# Patient Record
Sex: FEMALE | Race: WHITE | NOT HISPANIC OR LATINO | Employment: UNEMPLOYED | URBAN - NONMETROPOLITAN AREA
[De-identification: names, ages, dates, MRNs, and addresses within clinical notes are randomized per-mention and may not be internally consistent; named-entity substitution may affect disease eponyms.]

---

## 2017-01-25 ENCOUNTER — OFFICE VISIT (OUTPATIENT)
Dept: ENDOCRINOLOGY | Facility: CLINIC | Age: 47
End: 2017-01-25

## 2017-01-25 DIAGNOSIS — E10.649 UNCONTROLLED TYPE 1 DIABETES MELLITUS WITH HYPOGLYCEMIA WITHOUT COMA (HCC): Primary | ICD-10-CM

## 2017-01-25 PROCEDURE — PTNOCHG PR CUSTOM PT NO CHARGE VISIT: Performed by: INTERNAL MEDICINE

## 2017-01-25 NOTE — MR AVS SNAPSHOT
Suzy Coyne   2017 1:30 PM   Appointment    Dept Phone:  [phone]   Encounter #:  [id number]    Provider:  [facility]   Department:  [facility] ENDOCRINOLOGY                Your Full Care Plan              Your Updated Medication List          This list is accurate as of: 17  2:02 PM.  Always use your most recent med list.                ALTACE 5 MG capsule   Generic drug:  ramipril       GLUCAGEN HYPOKIT 1 MG injection   Generic drug:  glucagon       GLUCAGON EMERGENCY 1 MG injection   Generic drug:  glucagon       glucose blood test strip       HUMALOG 100 UNIT/ML solution cartridge   Generic drug:  Insulin Lispro       Urine Glucose-Ketones Test strip       VITAMIN D2 PO               Instructions     None    Patient Instructions History      Upcoming Appointments     Visit Type Date Time Department    DIABETES EDUCATION 2017  1:30 PM [facility]CR[facility]OLOGY [facility]    FOLLOW UP 2017 10:00 AM [facility]OCR[facility]OLOGY [facility]      MyChart Signup     [facility] [employer] allows you to send messages to your doctor, view your test results, renew your prescriptions, schedule appointments, and more. To sign up, go to [employer] and click on the Sign Up Now link in the New User? box. Enter your [employer] Activation Code exactly as it appears below along with the last four digits of your Social Security Number and your Date of Birth () to complete the sign-up process. If you do not sign up before the expiration date, you must request a new code.    [employer] Activation Code: [email or web address]  Expires: 2017  2:02 PM    If you have questions, you can email [email or web address] or call [phone] to talk to our [employer] staff. Remember, [employer] is NOT to be used for urgent needs. For medical emergencies, dial 911.               Other Info from Your Visit           Your Appointments     May 26, 2017 10:00 AM CDT   Follow Up  with Babatunde Gallardo MD   [facility] ENDOCRINOLOGY (--)    [address]  [facility] [place] [address] [place] [id number] [phone]           Arrive 15 minutes prior to appointment.              Allergies     No Known Allergies      Vital Signs     Smoking Status                   Never Smoker

## 2017-01-25 NOTE — PROGRESS NOTES
Suzy Coyne is a 47 y.o. female seen by diabetes educator 01/25/2017 for insulin pump download. Patient having fasting hypoglycemia. Decreased MN to 0800 basal to 0.55. Patient is manually bolusing for meals. Discussed the importance of monitoring bg 2 hours after eating. If hypoglycemia occurs after a meal, take note and next time give less insulin for that meal. Patient does not want to carb count. Patient to follow up with Dr. Colindres in 3 months.     Roxanna Aragon MS, RD, LD, CDE

## 2017-04-28 RX ORDER — CHOLECALCIFEROL (VITAMIN D3) 125 MCG
50000 TABLET ORAL
Qty: 8 TABLET | Refills: 2 | Status: SHIPPED | OUTPATIENT
Start: 2017-04-28 | End: 2018-05-07 | Stop reason: SDUPTHER

## 2017-08-02 ENCOUNTER — OFFICE VISIT (OUTPATIENT)
Dept: ENDOCRINOLOGY | Facility: CLINIC | Age: 47
End: 2017-08-02

## 2017-08-02 VITALS
BODY MASS INDEX: 32.98 KG/M2 | HEART RATE: 88 BPM | HEIGHT: 60 IN | SYSTOLIC BLOOD PRESSURE: 142 MMHG | DIASTOLIC BLOOD PRESSURE: 86 MMHG | WEIGHT: 168 LBS

## 2017-08-02 DIAGNOSIS — E55.9 VITAMIN D DEFICIENCY: ICD-10-CM

## 2017-08-02 DIAGNOSIS — E06.3 HASHIMOTO'S THYROIDITIS: ICD-10-CM

## 2017-08-02 DIAGNOSIS — E10.649 UNCONTROLLED TYPE 1 DIABETES MELLITUS WITH HYPOGLYCEMIA WITHOUT COMA (HCC): Primary | ICD-10-CM

## 2017-08-02 PROCEDURE — 99213 OFFICE O/P EST LOW 20 MIN: CPT | Performed by: NURSE PRACTITIONER

## 2017-08-02 RX ORDER — LEVOTHYROXINE SODIUM 0.1 MG/1
100 TABLET ORAL
COMMUNITY

## 2017-08-02 RX ORDER — TIZANIDINE 4 MG/1
TABLET ORAL
COMMUNITY
Start: 2017-07-07 | End: 2018-02-17

## 2017-08-02 RX ORDER — PRAVASTATIN SODIUM 20 MG
TABLET ORAL
COMMUNITY
Start: 2017-07-07 | End: 2017-09-13

## 2017-08-02 RX ORDER — CHOLECALCIFEROL (VITAMIN D3) 125 MCG
50000 TABLET ORAL
COMMUNITY
Start: 2017-04-28 | End: 2018-02-17

## 2017-08-02 NOTE — PROGRESS NOTES
Subjective    Suzy Coyne is a 47 y.o. female. she is here today for follow-up.    History of Present Illness         History of Present Illness  Duration/Timing:  Diabetes mellitus type 1  11 years     constant     Experiencing nocturnal hypoglycemia , severe      Severity (Complications/Hospitalizations)  Secondary Macrovascular Complications:  No CAD, No CVA, No PAD  Secondary Microvascular Complications:  No Diabetic Nephropathy, No proteinuria, No Diabetic Retinopathy, , No Diabetic Neuropathy     Context  Diabetes Regimen:  Insulin, Compliant with regimen    Lab Results   Component Value Date    HGBA1C 7.8 (H) 08/02/2016          Blood Glucose Readings     hypoglycemia unawareness      Associated Signs/Symptoms  Hyperglycemic Symptoms:  No polyuria, No polydipsia, No polyphagia, No blurred vision, No weight gain  Hypoglycemic Episodes:  Documented symptomatic hypoglycemia, Hypoglycemic unawareness               The following portions of the patient's history were reviewed and updated as appropriate:   Past Medical History:   Diagnosis Date   • Essential (primary) hypertension    • Hashimoto's thyroiditis    • Hypoglycemia    • Microalbuminuria    • Type I diabetes mellitus, uncontrolled    • Vitamin D deficiency      History reviewed. No pertinent surgical history.  Family History   Problem Relation Age of Onset   • Diabetes Neg Hx      OB History     No data available        Current Outpatient Prescriptions   Medication Sig Dispense Refill   • Ergocalciferol (VITAMIN D2) 2000 UNITS tablet Take 50,000 Units by mouth.     • cyclobenzaprine (FLEXERIL) 10 MG tablet Take 1 tablet by mouth 3 (Three) Times a Day As Needed for Muscle Spasms. 20 tablet 0   • Ergocalciferol (VITAMIN D2) 2000 UNITS tablet Take 50,000 Units by mouth Every 14 (Fourteen) Days. 8 tablet 2   • glucagon (GLUCAGEN HYPOKIT) 1 MG injection Infuse  into a venous catheter 1 (one) time. As directed     • glucagon (GLUCAGON EMERGENCY) 1 MG  injection Inject 1 mg under the skin 1 (one) time as needed for low blood sugar.     • glucose blood test strip 1 each by Other route 6 (six) times a day. Use as instructed     • insulin lispro (humaLOG) 100 UNIT/ML injection Inject  under the skin.     • Insulin Lispro, Human, (HUMALOG) 100 UNIT/ML solution cartridge Inject 90 Units under the skin daily. Through the insulin pump     • KETOCARE strip      • levothyroxine (SYNTHROID, LEVOTHROID) 100 MCG tablet Take 100 mcg by mouth.     • naproxen (EC NAPROSYN) 500 MG EC tablet Take 1 tablet by mouth 2 (Two) Times a Day As Needed for Mild Pain (1-3). 30 tablet 0   • pravastatin (PRAVACHOL) 20 MG tablet      • ramipril (ALTACE) 5 MG capsule Take 5 mg by mouth daily.     • tiZANidine (ZANAFLEX) 4 MG tablet      • Urine Glucose-Ketones Test strip Use as indicated       No current facility-administered medications for this visit.      No Known Allergies  Social History     Social History   • Marital status:      Spouse name: N/A   • Number of children: N/A   • Years of education: N/A     Social History Main Topics   • Smoking status: Never Smoker   • Smokeless tobacco: Never Used   • Alcohol use No   • Drug use: No   • Sexual activity: Not Asked     Other Topics Concern   • None     Social History Narrative       Review of Systems  Review of Systems   Constitutional: Negative for activity change, appetite change, diaphoresis and fatigue.   HENT: Negative for congestion, dental problem, drooling, ear discharge, facial swelling, sneezing, sore throat, tinnitus, trouble swallowing and voice change.    Eyes: Negative for photophobia, pain, discharge, redness, itching and visual disturbance.   Respiratory: Negative for apnea, cough, choking, chest tightness and shortness of breath.    Cardiovascular: Negative for chest pain, palpitations and leg swelling.   Gastrointestinal: Negative for abdominal distention, abdominal pain, constipation, diarrhea, nausea and  "vomiting.   Endocrine: Negative for cold intolerance, heat intolerance, polydipsia, polyphagia and polyuria.   Genitourinary: Negative for difficulty urinating, dysuria, frequency, hematuria and urgency.   Musculoskeletal: Negative for arthralgias, back pain, gait problem, joint swelling, myalgias, neck pain and neck stiffness.   Skin: Negative for color change, pallor, rash and wound.   Allergic/Immunologic: Negative for environmental allergies, food allergies and immunocompromised state.   Neurological: Negative for dizziness, tremors, facial asymmetry, weakness, light-headedness, numbness and headaches.   Hematological: Negative for adenopathy. Does not bruise/bleed easily.   Psychiatric/Behavioral: Negative for agitation, behavioral problems, confusion and sleep disturbance.        Objective    /86 (BP Location: Left arm, Patient Position: Sitting, Cuff Size: Adult)  Pulse 88  Ht 60\" (152.4 cm)  Wt 168 lb (76.2 kg)  BMI 32.81 kg/m2  Physical Exam   Constitutional: She is oriented to person, place, and time. She appears well-developed and well-nourished. No distress.   HENT:   Head: Normocephalic and atraumatic.   Right Ear: External ear normal.   Left Ear: External ear normal.   Nose: Nose normal.   Eyes: Conjunctivae and EOM are normal. Pupils are equal, round, and reactive to light.   Neck: Normal range of motion. Neck supple. No tracheal deviation present. No thyromegaly present.   Cardiovascular: Normal rate, regular rhythm and normal heart sounds.    No murmur heard.  Pulmonary/Chest: Effort normal and breath sounds normal. No respiratory distress. She has no wheezes.   Abdominal: Soft. Bowel sounds are normal. There is no tenderness. There is no rebound and no guarding.   Musculoskeletal: Normal range of motion. She exhibits no edema, tenderness or deformity.   Neurological: She is alert and oriented to person, place, and time. No cranial nerve deficit.   Skin: Skin is warm and dry. No rash " noted.   Psychiatric: She has a normal mood and affect. Her behavior is normal. Judgment and thought content normal.       Lab Review  Glucose (mg/dl)   Date Value   08/02/2016 170 (H)   02/05/2016 145 (H)     Sodium (mmol/L)   Date Value   08/02/2016 138   02/05/2016 138     Potassium (mmol/L)   Date Value   08/02/2016 4.8   02/05/2016 4.5     Chloride (mmol/L)   Date Value   08/02/2016 103   02/05/2016 100     CO2 (mmol/L)   Date Value   08/02/2016 28   02/05/2016 25     BUN (mg/dl)   Date Value   08/02/2016 12   02/05/2016 8     Creatinine (mg/dl)   Date Value   08/02/2016 0.8   02/05/2016 0.6     Hemoglobin A1C (%TotHgb)   Date Value   08/02/2016 7.8 (H)   02/05/2016 8.5 (H)     Triglycerides (mg/dl)   Date Value   02/05/2016 167       Assessment/Plan      1. Uncontrolled type 1 diabetes mellitus with hypoglycemia without coma    2. Hashimoto's thyroiditis    3. Vitamin D deficiency    .    Medications prescribed:  Outpatient Encounter Prescriptions as of 8/2/2017   Medication Sig Dispense Refill   • Ergocalciferol (VITAMIN D2) 2000 UNITS tablet Take 50,000 Units by mouth.     • cyclobenzaprine (FLEXERIL) 10 MG tablet Take 1 tablet by mouth 3 (Three) Times a Day As Needed for Muscle Spasms. 20 tablet 0   • Ergocalciferol (VITAMIN D2) 2000 UNITS tablet Take 50,000 Units by mouth Every 14 (Fourteen) Days. 8 tablet 2   • glucagon (GLUCAGEN HYPOKIT) 1 MG injection Infuse  into a venous catheter 1 (one) time. As directed     • glucagon (GLUCAGON EMERGENCY) 1 MG injection Inject 1 mg under the skin 1 (one) time as needed for low blood sugar.     • glucose blood test strip 1 each by Other route 6 (six) times a day. Use as instructed     • insulin lispro (humaLOG) 100 UNIT/ML injection Inject  under the skin.     • Insulin Lispro, Human, (HUMALOG) 100 UNIT/ML solution cartridge Inject 90 Units under the skin daily. Through the insulin pump     • KETOCARE strip      • levothyroxine (SYNTHROID, LEVOTHROID) 100 MCG tablet  Take 100 mcg by mouth.     • naproxen (EC NAPROSYN) 500 MG EC tablet Take 1 tablet by mouth 2 (Two) Times a Day As Needed for Mild Pain (1-3). 30 tablet 0   • pravastatin (PRAVACHOL) 20 MG tablet      • ramipril (ALTACE) 5 MG capsule Take 5 mg by mouth daily.     • tiZANidine (ZANAFLEX) 4 MG tablet      • Urine Glucose-Ketones Test strip Use as indicated       No facility-administered encounter medications on file as of 8/2/2017.        Orders placed during this encounter include:  Orders Placed This Encounter   Procedures   • Comprehensive Metabolic Panel   • Hemoglobin A1c   • TSH   • Vitamin D 25 Hydroxy   • CBC & Differential     Order Specific Question:   Manual Differential     Answer:   No     Type I diabetes mellitus, uncontrolled            Glycemic Management      Insulin Pump         before pump      lantus 25 units at night        Humalog 5 to 15 based on experience  ---     now on omnipod     basal 0.8 -   change MN to 11 a.m. to 0.75      MN 0.7  11 a.m 0.8     Change        MN 0.5  11 a.m. 0.8  10:30 - 0.6     bolusing by experience     using dexcom     bolusing after meals     try to bolus before meals     no need for carb counitng as long as 2 hour post rise doesn't exceed 180         insurance doesn't want to pay for freestyle     she is checking with a different meter and having to enter that into omnipod and that , of course, results in underdocumentation when she forgets to enter. This is particularly true with low glucose     needs freestyle     Patient has Ketone Strips     Patient was advised to go to ER if BG is more than 250, ketones are present and symptoms are present      Hypoglycemia Management     Discussed Rule of 15   Patent Has Glucagon Pen      Lipid Management              no need for statin   Blood Pressure Management     Patient is on ACE inhibitor  On altace and bp controlled     Microvascular Complications     Last Eye Exam      Microalbuminuria present  Patient is on  ACE inhibitor     Diabetic Neuropathy absent             Weight  Management         General weight loss/lifestyle modification strategies discussed (elicit support from others; identify saboteurs; non-food rewards, etc).        Smoking Cessation                 Bone / Calcium     Vit d def , 2-16 at 24     On vit d 50 th u q 2 w      Thyroid            Lab Results   Component Value Date     TSH 0.11 (L) 08/02/2016      On brand name synthroid 125 , skip one day per week until you run out   chage to 100         B12           Lab Results   Component Value Date     [id number] 262 02/05/2016            Immunizations     Has Had Pneumovax no  Has Had Prevnar 13no    Will keep appt on August 31, 2017 with Dr.Chang Mario one week prior to appt    4. Follow-up: Return for Recheck.

## 2017-09-13 ENCOUNTER — APPOINTMENT (OUTPATIENT)
Dept: LAB | Facility: HOSPITAL | Age: 47
End: 2017-09-13

## 2017-09-13 ENCOUNTER — OFFICE VISIT (OUTPATIENT)
Dept: ENDOCRINOLOGY | Facility: CLINIC | Age: 47
End: 2017-09-13

## 2017-09-13 VITALS
BODY MASS INDEX: 32.08 KG/M2 | HEIGHT: 60 IN | HEART RATE: 78 BPM | WEIGHT: 163.4 LBS | DIASTOLIC BLOOD PRESSURE: 82 MMHG | SYSTOLIC BLOOD PRESSURE: 136 MMHG

## 2017-09-13 DIAGNOSIS — E78.2 MIXED DIABETIC HYPERLIPIDEMIA ASSOCIATED WITH TYPE 1 DIABETES MELLITUS (HCC): ICD-10-CM

## 2017-09-13 DIAGNOSIS — D72.829 LEUKOCYTOSIS, UNSPECIFIED TYPE: ICD-10-CM

## 2017-09-13 DIAGNOSIS — E10.649 TYPE 1 DIABETES MELLITUS WITH HYPOGLYCEMIA AND WITHOUT COMA (HCC): Primary | ICD-10-CM

## 2017-09-13 DIAGNOSIS — E03.8 HYPOTHYROIDISM DUE TO HASHIMOTO'S THYROIDITIS: ICD-10-CM

## 2017-09-13 DIAGNOSIS — E10.69 MIXED DIABETIC HYPERLIPIDEMIA ASSOCIATED WITH TYPE 1 DIABETES MELLITUS (HCC): ICD-10-CM

## 2017-09-13 DIAGNOSIS — E55.9 VITAMIN D DEFICIENCY: ICD-10-CM

## 2017-09-13 DIAGNOSIS — E06.3 HASHIMOTO'S THYROIDITIS: ICD-10-CM

## 2017-09-13 DIAGNOSIS — E06.3 HYPOTHYROIDISM DUE TO HASHIMOTO'S THYROIDITIS: ICD-10-CM

## 2017-09-13 DIAGNOSIS — F98.8 ADD (ATTENTION DEFICIT DISORDER): ICD-10-CM

## 2017-09-13 LAB
25(OH)D3 SERPL-MCNC: 44.3 NG/ML (ref 30–100)
ALBUMIN SERPL-MCNC: 4.2 G/DL (ref 3.4–4.8)
ALBUMIN UR-MCNC: 2.1 MG/L
ALBUMIN/GLOB SERPL: 1.1 G/DL (ref 1.1–1.8)
ALP SERPL-CCNC: 91 U/L (ref 38–126)
ALT SERPL W P-5'-P-CCNC: 27 U/L (ref 9–52)
ANION GAP SERPL CALCULATED.3IONS-SCNC: 9 MMOL/L (ref 5–15)
ARTICHOKE IGE QN: 145 MG/DL (ref 1–129)
AST SERPL-CCNC: 19 U/L (ref 14–36)
BASOPHILS # BLD AUTO: 0.02 10*3/MM3 (ref 0–0.2)
BASOPHILS NFR BLD AUTO: 0.2 % (ref 0–2)
BILIRUB SERPL-MCNC: 0.5 MG/DL (ref 0.2–1.3)
BUN BLD-MCNC: 8 MG/DL (ref 7–21)
BUN/CREAT SERPL: 8.4 (ref 7–25)
CALCIUM SPEC-SCNC: 8.8 MG/DL (ref 8.4–10.2)
CHLORIDE SERPL-SCNC: 97 MMOL/L (ref 95–110)
CHOLEST SERPL-MCNC: 220 MG/DL (ref 0–199)
CO2 SERPL-SCNC: 25 MMOL/L (ref 22–31)
CREAT BLD-MCNC: 0.95 MG/DL (ref 0.5–1)
CREAT UR-MCNC: 130.6 MG/DL
DEPRECATED RDW RBC AUTO: 43.5 FL (ref 36.4–46.3)
EOSINOPHIL # BLD AUTO: 0.12 10*3/MM3 (ref 0–0.7)
EOSINOPHIL NFR BLD AUTO: 1 % (ref 0–7)
ERYTHROCYTE [DISTWIDTH] IN BLOOD BY AUTOMATED COUNT: 13.5 % (ref 11.5–14.5)
GFR SERPL CREATININE-BSD FRML MDRD: 63 ML/MIN/1.73 (ref 60–135)
GLOBULIN UR ELPH-MCNC: 3.8 GM/DL (ref 2.3–3.5)
GLUCOSE BLD-MCNC: 139 MG/DL (ref 60–100)
GLUCOSE BLDC GLUCOMTR-MCNC: 148 MG/DL (ref 70–130)
HCT VFR BLD AUTO: 44.2 % (ref 35–45)
HDLC SERPL-MCNC: 51 MG/DL (ref 60–200)
HGB BLD-MCNC: 14.7 G/DL (ref 12–15.5)
IMM GRANULOCYTES # BLD: 0.05 10*3/MM3 (ref 0–0.02)
IMM GRANULOCYTES NFR BLD: 0.4 % (ref 0–0.5)
LDLC/HDLC SERPL: 2.75 {RATIO} (ref 0–3.22)
LYMPHOCYTES # BLD AUTO: 2.72 10*3/MM3 (ref 0.6–4.2)
LYMPHOCYTES NFR BLD AUTO: 22.5 % (ref 10–50)
MCH RBC QN AUTO: 29.4 PG (ref 26.5–34)
MCHC RBC AUTO-ENTMCNC: 33.3 G/DL (ref 31.4–36)
MCV RBC AUTO: 88.4 FL (ref 80–98)
MICROALBUMIN/CREAT UR: 16.1 MG/G (ref 0–30)
MONOCYTES # BLD AUTO: 0.83 10*3/MM3 (ref 0–0.9)
MONOCYTES NFR BLD AUTO: 6.9 % (ref 0–12)
NEUTROPHILS # BLD AUTO: 8.36 10*3/MM3 (ref 2–8.6)
NEUTROPHILS NFR BLD AUTO: 69 % (ref 37–80)
PLATELET # BLD AUTO: 309 10*3/MM3 (ref 150–450)
PMV BLD AUTO: 10.2 FL (ref 8–12)
POTASSIUM BLD-SCNC: 4.6 MMOL/L (ref 3.5–5.1)
PROT SERPL-MCNC: 8 G/DL (ref 6.3–8.6)
RBC # BLD AUTO: 5 10*6/MM3 (ref 3.77–5.16)
SODIUM BLD-SCNC: 131 MMOL/L (ref 137–145)
TRIGL SERPL-MCNC: 144 MG/DL (ref 20–199)
TSH SERPL DL<=0.05 MIU/L-ACNC: 4.22 MIU/ML (ref 0.46–4.68)
VIT B12 BLD-MCNC: 291 PG/ML (ref 239–931)
WBC NRBC COR # BLD: 12.1 10*3/MM3 (ref 3.2–9.8)

## 2017-09-13 PROCEDURE — 82962 GLUCOSE BLOOD TEST: CPT | Performed by: INTERNAL MEDICINE

## 2017-09-13 PROCEDURE — 82570 ASSAY OF URINE CREATININE: CPT | Performed by: INTERNAL MEDICINE

## 2017-09-13 PROCEDURE — 82607 VITAMIN B-12: CPT | Performed by: INTERNAL MEDICINE

## 2017-09-13 PROCEDURE — 82043 UR ALBUMIN QUANTITATIVE: CPT | Performed by: INTERNAL MEDICINE

## 2017-09-13 PROCEDURE — 85025 COMPLETE CBC W/AUTO DIFF WBC: CPT | Performed by: INTERNAL MEDICINE

## 2017-09-13 PROCEDURE — 80061 LIPID PANEL: CPT | Performed by: INTERNAL MEDICINE

## 2017-09-13 PROCEDURE — 36415 COLL VENOUS BLD VENIPUNCTURE: CPT | Performed by: INTERNAL MEDICINE

## 2017-09-13 PROCEDURE — 80053 COMPREHEN METABOLIC PANEL: CPT | Performed by: INTERNAL MEDICINE

## 2017-09-13 PROCEDURE — 99214 OFFICE O/P EST MOD 30 MIN: CPT | Performed by: INTERNAL MEDICINE

## 2017-09-13 PROCEDURE — 82306 VITAMIN D 25 HYDROXY: CPT | Performed by: INTERNAL MEDICINE

## 2017-09-13 PROCEDURE — 84443 ASSAY THYROID STIM HORMONE: CPT | Performed by: INTERNAL MEDICINE

## 2017-09-13 PROCEDURE — 83036 HEMOGLOBIN GLYCOSYLATED A1C: CPT | Performed by: INTERNAL MEDICINE

## 2017-09-13 NOTE — PROGRESS NOTES
Suzy Coyne is a 47 y.o. female who presents for fu evalution of  Type 1 diabetes mellitus.      Referred by No ref. provider found  Jeff Maldonado MD      History of Present Illness  Duration/Timing:  Diabetes mellitus type 1  11 years    constant    Experiencing nocturnal hypoglycemia , severe     Severity (Complications/Hospitalizations)  Secondary Macrovascular Complications:  No CAD, No CVA, No PAD  Secondary Microvascular Complications:  No Diabetic Nephropathy, No proteinuria, No Diabetic Retinopathy, Date of last eye exam 02/05/2014, No Diabetic Neuropathy    Context  Diabetes Regimen:  Insulin, Compliant with regimen    Blood Glucose Readings    hypoglycemia unawareness     Associated Signs/Symptoms  Hyperglycemic Symptoms:  No polyuria, No polydipsia, No polyphagia, No blurred vision, No weight gain  Hypoglycemic Episodes:  Documented symptomatic hypoglycemia, Hypoglycemic unawareness        Past Medical History:   Diagnosis Date   • Essential (primary) hypertension    • Hashimoto's thyroiditis    • Hypoglycemia    • Microalbuminuria    • Type I diabetes mellitus, uncontrolled    • Vitamin D deficiency      Family History   Problem Relation Age of Onset   • Diabetes Neg Hx      Social History   Substance Use Topics   • Smoking status: Never Smoker   • Smokeless tobacco: Never Used   • Alcohol use No       Review of Systems    Review of Systems   Constitutional: Negative for activity change, appetite change, chills, diaphoresis, fatigue, fever and unexpected weight change.   HENT: Negative for congestion, drooling, ear discharge, ear pain, facial swelling, mouth sores, nosebleeds, postnasal drip, sinus pressure, sneezing, sore throat, tinnitus, trouble swallowing and voice change.    Eyes: Negative.  Negative for photophobia, pain, discharge, redness and itching.   Respiratory: Negative.  Negative for apnea, cough, choking, chest tightness, shortness of breath, wheezing and stridor.   "  Cardiovascular: Negative.  Negative for chest pain, palpitations and leg swelling.   Gastrointestinal: Negative.  Negative for abdominal distention, abdominal pain, constipation, diarrhea, nausea and vomiting.   Endocrine: Negative.  Negative for cold intolerance, heat intolerance, polydipsia, polyphagia and polyuria.   Genitourinary: Negative for difficulty urinating, dysuria, flank pain and frequency.   Musculoskeletal: Negative for arthralgias, back pain, gait problem, joint swelling, myalgias, neck pain and neck stiffness.   Skin: Negative for color change, pallor, rash and wound.   Allergic/Immunologic: Negative for environmental allergies, food allergies and immunocompromised state.   Neurological: Negative for dizziness, tremors, seizures, syncope, facial asymmetry, speech difficulty, weakness, light-headedness, numbness and headaches.   Hematological: Negative for adenopathy. Does not bruise/bleed easily.   Psychiatric/Behavioral: Negative for agitation, behavioral problems, confusion, decreased concentration, dysphoric mood, hallucinations, self-injury, sleep disturbance and suicidal ideas. The patient is not nervous/anxious and is not hyperactive.         Objective:     /82 (BP Location: Left arm, Patient Position: Sitting, Cuff Size: Adult)  Pulse 78  Ht 60\" (152.4 cm)  Wt 163 lb 6.4 oz (74.1 kg)  BMI 31.91 kg/m2    Physical Exam   Constitutional: She is oriented to person, place, and time. She appears well-developed.   HENT:   Head: Normocephalic.   Right Ear: External ear normal.   Left Ear: External ear normal.   Nose: Nose normal.   Eyes: Conjunctivae and EOM are normal. No scleral icterus.   Neck: Normal range of motion. Neck supple. No tracheal deviation present. No thyromegaly present.   Cardiovascular: Normal rate, regular rhythm, normal heart sounds and intact distal pulses.  Exam reveals no gallop and no friction rub.    No murmur heard.  Pulmonary/Chest: Effort normal and breath " sounds normal. No stridor. No respiratory distress. She has no wheezes. She has no rales. She exhibits no tenderness.   Abdominal: Soft. Bowel sounds are normal. She exhibits no distension and no mass. There is no tenderness. There is no rebound and no guarding.   Musculoskeletal: Normal range of motion. She exhibits no tenderness or deformity.   Lymphadenopathy:     She has no cervical adenopathy.   Neurological: She is alert and oriented to person, place, and time. She displays normal reflexes. She exhibits normal muscle tone. Coordination normal.   Skin: No rash noted. No erythema. No pallor.   Psychiatric: She has a normal mood and affect. Her behavior is normal. Judgment and thought content normal.       Lab Review    Lab Results   Component Value Date    HGBA1C 7.8 (H) 09/13/2017       Results for orders placed or performed in visit on 09/13/17   CBC Auto Differential   Result Value Ref Range    WBC 12.10 (H) 3.20 - 9.80 10*3/mm3    RBC 5.00 3.77 - 5.16 10*6/mm3    Hemoglobin 14.7 12.0 - 15.5 g/dL    Hematocrit 44.2 35.0 - 45.0 %    MCV 88.4 80.0 - 98.0 fL    MCH 29.4 26.5 - 34.0 pg    MCHC 33.3 31.4 - 36.0 g/dL    RDW 13.5 11.5 - 14.5 %    RDW-SD 43.5 36.4 - 46.3 fl    MPV 10.2 8.0 - 12.0 fL    Platelets 309 150 - 450 10*3/mm3    Neutrophil % 69.0 37.0 - 80.0 %    Lymphocyte % 22.5 10.0 - 50.0 %    Monocyte % 6.9 0.0 - 12.0 %    Eosinophil % 1.0 0.0 - 7.0 %    Basophil % 0.2 0.0 - 2.0 %    Immature Grans % 0.4 0.0 - 0.5 %    Neutrophils, Absolute 8.36 2.00 - 8.60 10*3/mm3    Lymphocytes, Absolute 2.72 0.60 - 4.20 10*3/mm3    Monocytes, Absolute 0.83 0.00 - 0.90 10*3/mm3    Eosinophils, Absolute 0.12 0.00 - 0.70 10*3/mm3    Basophils, Absolute 0.02 0.00 - 0.20 10*3/mm3    Immature Grans, Absolute 0.05 (H) 0.00 - 0.02 10*3/mm3   Comprehensive Metabolic Panel   Result Value Ref Range    Glucose 139 (H) 60 - 100 mg/dL    BUN 8 7 - 21 mg/dL    Creatinine 0.95 0.50 - 1.00 mg/dL    Sodium 131 (L) 137 - 145 mmol/L     Potassium 4.6 3.5 - 5.1 mmol/L    Chloride 97 95 - 110 mmol/L    CO2 25.0 22.0 - 31.0 mmol/L    Calcium 8.8 8.4 - 10.2 mg/dL    Total Protein 8.0 6.3 - 8.6 g/dL    Albumin 4.20 3.40 - 4.80 g/dL    ALT (SGPT) 27 9 - 52 U/L    AST (SGOT) 19 14 - 36 U/L    Alkaline Phosphatase 91 38 - 126 U/L    Total Bilirubin 0.5 0.2 - 1.3 mg/dL    eGFR Non African Amer 63 >60 mL/min/1.73    Globulin 3.8 (H) 2.3 - 3.5 gm/dL    A/G Ratio 1.1 1.1 - 1.8 g/dL    BUN/Creatinine Ratio 8.4 7.0 - 25.0    Anion Gap 9.0 5.0 - 15.0 mmol/L   Hemoglobin A1c   Result Value Ref Range    Hemoglobin A1C 7.8 (H) 4 - 5.6 %   Lipid Panel   Result Value Ref Range    Total Cholesterol 220 (H) 0 - 199 mg/dL    Triglycerides 144 20 - 199 mg/dL    HDL Cholesterol 51 (L) 60 - 200 mg/dL    LDL Cholesterol  145 (H) 1 - 129 mg/dL    LDL/HDL Ratio 2.75 0.00 - 3.22   Microalbumin / Creatinine Urine Ratio   Result Value Ref Range    Microalbumin/Creatinine Ratio 16.1 0.0 - 30.0 mg/g    Creatinine, Urine 130.6 mg/dL    Microalbumin, Urine 2.1 mg/L   TSH   Result Value Ref Range    TSH 4.220 0.460 - 4.680 mIU/mL   Vitamin B12   Result Value Ref Range    Vitamin B-12 291 239 - 931 pg/mL   Vitamin D 25 Hydroxy   Result Value Ref Range    25 Hydroxy, Vitamin D 44.3 30.0 - 100.0 ng/ml   POC Glucose Fingerstick   Result Value Ref Range    Glucose 148 (A) 70 - 130 mg/dL       Lab Results   Component Value Date    TSH 4.220 09/13/2017         Assessment/Plan     Suzy was seen today for diabetes.    Diagnoses and all orders for this visit:    Type 1 diabetes mellitus with hypoglycemia and without coma  -     POC Glucose Fingerstick  -     CBC Auto Differential  -     Comprehensive Metabolic Panel  -     Hemoglobin A1c  -     Lipid Panel  -     Microalbumin / Creatinine Urine Ratio  -     TSH  -     Vitamin B12  -     Vitamin D 25 Hydroxy    Hashimoto's thyroiditis  -     CBC Auto Differential  -     Comprehensive Metabolic Panel  -     Hemoglobin A1c  -     Lipid Panel  -      Microalbumin / Creatinine Urine Ratio  -     TSH  -     Vitamin B12  -     Vitamin D 25 Hydroxy    Vitamin D deficiency  -     CBC Auto Differential  -     Comprehensive Metabolic Panel  -     Hemoglobin A1c  -     Lipid Panel  -     Microalbumin / Creatinine Urine Ratio  -     TSH  -     Vitamin B12  -     Vitamin D 25 Hydroxy    Hypothyroidism due to Hashimoto's thyroiditis  -     CBC Auto Differential  -     Comprehensive Metabolic Panel  -     Hemoglobin A1c  -     Lipid Panel  -     Microalbumin / Creatinine Urine Ratio  -     TSH  -     Vitamin B12  -     Vitamin D 25 Hydroxy    ADD (attention deficit disorder)  -     Ambulatory Referral to Psychology    Mixed diabetic hyperlipidemia associated with type 1 diabetes mellitus              Glycemic Management     Insulin Pump       before pump     lantus 25 units at night      Humalog 5 to 15 based on experience  ---    now on omnipod    basal    change MN to 11 a.m. to 0.75 - 0.65 decrease to 0.55  8 a.m. 0.8 - 0.7  10:30 p.m. 0.8 - 0.6- 0.5         bolusing by experience    using dexcom    bolusing after meals    try to bolus before meals    no need for carb counitng as long as 2 hour post rise doesn't exceed 180       insurance doesn't want to pay for freestyle    she is checking with a different meter and having to enter that into omnipod and that , of course, results in underdocumentation when she forgets to enter. This is particularly true with low glucose    needs freestyle    Patient has Ketone Strips    Patient was advised to go to ER if BG is more than 250, ketones are present and symptoms are present     Hypoglycemia Management    Discussed Rule of 15   Patent Has Glucagon Pen     Lipid Management      Lab Results   Component Value Date    CHOL 220 (H) 09/13/2017     Lab Results   Component Value Date    TRIG 144 09/13/2017    TRIG 167 02/05/2016     Lab Results   Component Value Date    HDL 51 (L) 09/13/2017    HDL 52 (L) 02/05/2016     Lab  Results   Component Value Date    LDLCALC 133 (H) 02/05/2016     Add pravastatin 20 mg qhs       Blood Pressure Management    Patient is on ACE inhibitor  On altace and bp controlled    Microvascular Complications    Last Eye Exam     Microalbuminuria present  Patient is on ACE inhibitor    Diabetic Neuropathy absent    Weight  Management   Wt Readings from Last 3 Encounters:   09/13/17 163 lb 6.4 oz (74.1 kg)   08/02/17 168 lb (76.2 kg)   07/29/17 164 lb 6.4 oz (74.6 kg)     Body mass index is 31.91 kg/(m^2).    General weight loss/lifestyle modification strategies discussed (elicit support from others; identify saboteurs; non-food rewards, etc).      Smoking Cessation    Social History   Substance Use Topics   • Smoking status: Never Smoker   • Smokeless tobacco: Never Used   • Alcohol use No             Bone / Calcium    Vit d def , 2-16 at 24    On vit d 50 th u q 2 w     Thyroid     Lab Results   Component Value Date    TSH 4.220 09/13/2017     On brand name synthroid 125 , skip one day per week until you run out   chage to 100       B12    Lab Results   Component Value Date    EPUCRKHQ55 291 09/13/2017         Immunizations    Has Had Pneumovax no  Has Had Prevnar 13no      Depression, Dr. Maldonado tried celexa , no effect  She questions alt medicine or possibility of ADHD      Leukocytosis,    Monitor     MDM    Coding

## 2017-09-14 LAB — HBA1C MFR BLD: 7.8 % (ref 4–5.6)

## 2017-09-14 RX ORDER — PRAVASTATIN SODIUM 20 MG
20 TABLET ORAL EVERY EVENING
Qty: 30 TABLET | Refills: 11 | Status: SHIPPED | OUTPATIENT
Start: 2017-09-14 | End: 2018-09-14

## 2017-10-03 ENCOUNTER — OFFICE VISIT (OUTPATIENT)
Dept: BEHAVIORAL HEALTH | Facility: CLINIC | Age: 47
End: 2017-10-03

## 2017-10-03 DIAGNOSIS — F90.2 ATTENTION DEFICIT HYPERACTIVITY DISORDER, COMBINED TYPE: Primary | ICD-10-CM

## 2017-10-03 PROCEDURE — 90791 PSYCH DIAGNOSTIC EVALUATION: CPT | Performed by: PSYCHOLOGIST

## 2017-10-03 NOTE — PROGRESS NOTES
10/3/2017    Suzy Coyne, a 47 y.o. female, was seen today for initial appointment lasting 45 minutes.  Patient is referred by Jeff Maldonado MD .     SUBJECTIVE:  Patient requested an evaluation for attention deficit hyperactivity disorder.  She has a long history of easy distractibility, and losing track of what she is doing.  Her friends report that she starts conversations and gets distracted in the middle of them.  When she tries to cleaning the house, she gets distracted and off task.  She has been treated previously for depression and anxiety but she is not taking any current psychiatric medication.  She keeps a list in order to stay organized.  Patient is  and lives with HER-2 teenage daughters, and has a steady boyfriend.    FAMILY HISTORY:  Family history is positive for ADHD in her sister who is on medication.  This patient was born and raised in [place].  She had a good childhood he was not subject to mistreatment.  She did well in school, main issue in school was juvenile diabetes.  She graduated from [facility] [employer].    MENTAL STATUS:  Patient reported that she tends to be scatterbrained, she starts things without finishing them, her mind tends to wander when she is trying to concentrate, friends complain that she doesn't pay attention to them, she blurts out what's on her mind, she is forgetful, and she is a procrastinator.  She doesn't require a lot of sleep and seems to get along just fine.  She is not depressed or anxious.  She is not vieira or irritable.  She's not suicidal, homicidal, nor does she have hallucinations.    DIAGNOSIS:    ICD-10-CM ICD-9-CM   1. Attention deficit hyperactivity disorder, combined type F90.2 314.01       ASSESSMENT PLAN:  Plan is to evaluate for attention deficit hyperactivity disorder, and screen for a mood disorder.  She was given [facility] rating scales for she and her boyfriend to complete.  I will see her for testing with  the [place] computerized continuous performance test.          This document has been electronically signed by Romeo Foss EdD on October 3, 2017 5:31 PM

## 2017-10-13 ENCOUNTER — OFFICE VISIT (OUTPATIENT)
Dept: BEHAVIORAL HEALTH | Facility: CLINIC | Age: 47
End: 2017-10-13

## 2017-10-13 DIAGNOSIS — F33.0 MILD EPISODE OF RECURRENT MAJOR DEPRESSIVE DISORDER (HCC): ICD-10-CM

## 2017-10-13 DIAGNOSIS — F41.1 GENERALIZED ANXIETY DISORDER: ICD-10-CM

## 2017-10-13 DIAGNOSIS — F90.2 ATTENTION DEFICIT HYPERACTIVITY DISORDER, COMBINED TYPE: Primary | ICD-10-CM

## 2017-10-13 PROCEDURE — 90834 PSYTX W PT 45 MINUTES: CPT | Performed by: PSYCHOLOGIST

## 2017-10-13 NOTE — PROGRESS NOTES
10/13/2017    Suzy Coyne, a 47 y.o. female, was seen today for a psychological evaluation lasting 45 minutes.  Patient is referred by Jeff Maldonado MD .     SUBJECTIVE:  Patient requested an evaluation for attention deficit hyperactivity disorder.  She has a long history of easy distractibility, and losing track of what she is doing.  Her friends report that she starts conversations and gets distracted in the middle of them.  When she tries to clean the house, she gets distracted and off task.  She has been treated previously for depression and anxiety but she is not taking any current psychiatric medication.  She keeps lists in order to stay organized.  Patient is  and lives with her two teenage daughters, and has a steady boyfriend.    FAMILY HISTORY:  Family history is positive for ADHD in her sister who is on medication.  This patient was born and raised in [facility].  She had a good childhood she was not subject to mistreatment.  She did well in school.  Her main issue in school was juvenile diabetes.  She graduated from [facility] high [employer].    MENTAL STATUS:  Patient reported that she tends to be scatterbrained, she starts things without finishing them, her mind tends to wander when she is trying to concentrate, friends complain that she doesn't pay attention to them, she blurts out what's on her mind, she is forgetful, and she is a procrastinator.  She doesn't require a lot of sleep and seems to get along just fine.  She is not depressed or anxious.  She is not vieira or irritable.  She's not suicidal, homicidal, nor does she have hallucinations.    This evaluation consisted of psychological testing with the [facility] computerized continuous performance test and the [facility] adult questionnaires completed by Suzy and her friend.  The [facility] required Suzy to click a mouse for certain visual and auditory targets displayed on the computer, in addition she had the refrain from  clicking the mouse for visual and auditory distractor nontargets.  Suzy made errors of omission by failure to respond when her response was called for suggesting inattention.  She had very slow reaction times again suggesting struggle with her attention.  She was inconsistent in her reaction times in the visual parts of the test suggesting struggling with her attention.  She did show some impulsivity by responding when her response was not called for.  She made a lot of fidgety off task movements with the mouse suggesting hyperactivity.    On the [facility] scales Suzy identified 6 symptoms for inattention.  She also identified OCD characteristics worry, and depression.  Her friend, Alvaro, identified 12 symptoms of inattention along with OCD characteristics, worry and depression.  Suzy reported that she is easily distracted, has difficulty sustaining attention, has difficulty following through on instructions, has difficulty keeping and area organized, makes a lot of careless mistakes, and is forgetful.  She also reported she has a tendency towards compulsive behavior, she has a intense dislike of change marriage and she has difficulty shifting her attention from one side to another.    DIAGNOSIS:  Major depression recurrent  Generalized anxiety disorder  Attention deficit hyperactivity disorder  ASSESSMENT PLAN:    Recommendations are for her to see her physician for consideration of treatment of the ADHD with appropriate stimulant medication.            This document has been electronically signed by Romeo Foss EdD on October 13, 2017 11:04 AM

## 2018-03-21 ENCOUNTER — OFFICE VISIT (OUTPATIENT)
Dept: ENDOCRINOLOGY | Facility: CLINIC | Age: 48
End: 2018-03-21

## 2018-03-21 VITALS
BODY MASS INDEX: 27.05 KG/M2 | DIASTOLIC BLOOD PRESSURE: 80 MMHG | WEIGHT: 147 LBS | HEART RATE: 122 BPM | HEIGHT: 62 IN | SYSTOLIC BLOOD PRESSURE: 148 MMHG

## 2018-03-21 DIAGNOSIS — E06.3 HASHIMOTO'S THYROIDITIS: ICD-10-CM

## 2018-03-21 DIAGNOSIS — E55.9 VITAMIN D DEFICIENCY: ICD-10-CM

## 2018-03-21 DIAGNOSIS — IMO0001 UNCONTROLLED TYPE 1 DIABETES MELLITUS WITHOUT COMPLICATION: Primary | ICD-10-CM

## 2018-03-21 DIAGNOSIS — E78.2 MIXED HYPERLIPIDEMIA: ICD-10-CM

## 2018-03-21 PROCEDURE — 99214 OFFICE O/P EST MOD 30 MIN: CPT | Performed by: NURSE PRACTITIONER

## 2018-03-21 RX ORDER — ATORVASTATIN CALCIUM 20 MG/1
TABLET, FILM COATED ORAL
COMMUNITY
Start: 2018-03-15 | End: 2018-11-20

## 2018-03-21 RX ORDER — ATORVASTATIN CALCIUM 10 MG/1
10 TABLET, FILM COATED ORAL DAILY
COMMUNITY
End: 2018-11-20

## 2018-03-21 NOTE — PROGRESS NOTES
Subjective    Suzy Coyne is a 48 y.o. female. she is here today for follow-up.    History of Present Illness     History of Present Illness  Duration/Timing:  Diabetes mellitus type 1  11 years     constant     Experiencing nocturnal hypoglycemia , severe      Severity (Complications/Hospitalizations)  Secondary Macrovascular Complications:  No CAD, No CVA, No PAD  Secondary Microvascular Complications:  No Diabetic Nephropathy, No proteinuria, No Diabetic Retinopathy, Date of last eye exam 02/05/2014, No Diabetic Neuropathy     Context  Diabetes Regimen:  Insulin, Compliant with regimen    Last A1c was high she states      Blood Glucose Readings    On omni pod insulin pump    Also on dexcom     Some days at goal        hypoglycemia unawareness      Associated Signs/Symptoms  Hyperglycemic Symptoms:  No polyuria, No polydipsia, No polyphagia, No blurred vision, No weight gain  Hypoglycemic Episodes:  Documented symptomatic hypoglycemia, Hypoglycemic unawareness              The following portions of the patient's history were reviewed and updated as appropriate:   Past Medical History:   Diagnosis Date   • Essential (primary) hypertension    • Hashimoto's thyroiditis    • Hypoglycemia    • Microalbuminuria    • Type I diabetes mellitus, uncontrolled    • Vitamin D deficiency      No past surgical history on file.  Family History   Problem Relation Age of Onset   • Diabetes Neg Hx      OB History     No data available        Current Outpatient Prescriptions   Medication Sig Dispense Refill   • atorvastatin (LIPITOR) 10 MG tablet Take 10 mg by mouth Daily.     • atorvastatin (LIPITOR) 20 MG tablet      • clonazePAM (KlonoPIN) 0.5 MG tablet      • Ergocalciferol (VITAMIN D2) 2000 UNITS tablet Take 50,000 Units by mouth Every 14 (Fourteen) Days. 8 tablet 2   • glucagon (GLUCAGON EMERGENCY) 1 MG injection Inject 1 mg under the skin 1 (one) time as needed for low blood sugar.     • glucose blood test strip 1  each by Other route 6 (six) times a day. Use as instructed     • insulin lispro (humaLOG) 100 UNIT/ML injection Inject  under the skin.     • levothyroxine (SYNTHROID, LEVOTHROID) 100 MCG tablet Take 100 mcg by mouth.     • naproxen (EC NAPROSYN) 500 MG EC tablet Take 1 tablet by mouth 2 (Two) Times a Day As Needed for Mild Pain (1-3). 30 tablet 0   • pravastatin (PRAVACHOL) 20 MG tablet Take 1 tablet by mouth Every Evening. 30 tablet 11   • ramipril (ALTACE) 5 MG capsule Take 5 mg by mouth daily.     • Urine Glucose-Ketones Test strip Use as indicated     • venlafaxine XR (EFFEXOR-XR) 75 MG 24 hr capsule      • vitamin D (ERGOCALCIFEROL) 05845 units capsule capsule        No current facility-administered medications for this visit.      No Known Allergies  Social History     Social History   • Marital status:      Social History Main Topics   • Smoking status: Never Smoker   • Smokeless tobacco: Never Used   • Alcohol use No   • Drug use: No     Other Topics Concern   • Not on file       Review of Systems  Review of Systems   Constitutional: Negative for activity change, appetite change, diaphoresis and fatigue.   HENT: Negative for facial swelling, sneezing, sore throat, tinnitus, trouble swallowing and voice change.    Eyes: Negative for photophobia, pain, discharge, redness, itching and visual disturbance.   Respiratory: Negative for apnea, cough, choking, chest tightness and shortness of breath.    Cardiovascular: Negative for chest pain, palpitations and leg swelling.   Gastrointestinal: Negative for abdominal distention, abdominal pain, constipation, diarrhea, nausea and vomiting.   Endocrine: Negative for cold intolerance, heat intolerance, polydipsia, polyphagia and polyuria.   Genitourinary: Negative for difficulty urinating, dysuria, frequency, hematuria and urgency.   Musculoskeletal: Negative for arthralgias, back pain, gait problem, joint swelling, myalgias, neck pain and neck stiffness.  "  Skin: Negative for color change, pallor, rash and wound.   Neurological: Negative for dizziness, tremors, weakness, light-headedness, numbness and headaches.   Hematological: Negative for adenopathy. Does not bruise/bleed easily.   Psychiatric/Behavioral: Negative for behavioral problems, confusion and sleep disturbance.        Objective    /80 (BP Location: Left arm, Patient Position: Sitting, Cuff Size: Adult)   Pulse (!) 122   Ht 157.5 cm (62\")   Wt 66.7 kg (147 lb)   BMI 26.89 kg/m²   Physical Exam   Constitutional: She is oriented to person, place, and time. She appears well-developed and well-nourished. No distress.   HENT:   Head: Normocephalic and atraumatic.   Right Ear: External ear normal.   Left Ear: External ear normal.   Nose: Nose normal.   Eyes: Conjunctivae and EOM are normal. Pupils are equal, round, and reactive to light.   Neck: Normal range of motion. Neck supple. No tracheal deviation present. No thyromegaly present.   Cardiovascular: Normal rate, regular rhythm and normal heart sounds.    No murmur heard.  Pulmonary/Chest: Effort normal and breath sounds normal. No respiratory distress. She has no wheezes.   Abdominal: Soft. Bowel sounds are normal. There is no tenderness. There is no rebound and no guarding.   Musculoskeletal: Normal range of motion. She exhibits no edema, tenderness or deformity.   Neurological: She is alert and oriented to person, place, and time. No cranial nerve deficit.   Skin: Skin is warm and dry. No rash noted.   Psychiatric: She has a normal mood and affect. Her behavior is normal. Judgment and thought content normal.       Lab Review  Glucose   Date Value   09/13/2017 139 mg/dL (H)   08/02/2016 170 mg/dl (H)   02/05/2016 145 mg/dl (H)     Sodium (mmol/L)   Date Value   09/13/2017 131 (L)   08/02/2016 138   02/05/2016 138     Potassium (mmol/L)   Date Value   09/13/2017 4.6   08/02/2016 4.8   02/05/2016 4.5     Chloride (mmol/L)   Date Value   09/13/2017 " 97   08/02/2016 103   02/05/2016 100     CO2 (mmol/L)   Date Value   09/13/2017 25.0   08/02/2016 28   02/05/2016 25     BUN   Date Value   09/13/2017 8 mg/dL   08/02/2016 12 mg/dl   02/05/2016 8 mg/dl     Creatinine   Date Value   09/13/2017 0.95 mg/dL   08/02/2016 0.8 mg/dl   02/05/2016 0.6 mg/dl     Hemoglobin A1C   Date Value   09/13/2017 7.8 % (H)   08/02/2016 7.8 %TotHgb (H)   02/05/2016 8.5 %TotHgb (H)     Triglycerides   Date Value   09/13/2017 144 mg/dL   02/05/2016 167 mg/dl     LDL Cholesterol    Date Value   09/13/2017 145 mg/dL (H)   02/05/2016 133 mg/dl (H)       Assessment/Plan      1. Uncontrolled type 1 diabetes mellitus without complication    2. Hashimoto's thyroiditis    3. Vitamin D deficiency    .    Medications prescribed:  Outpatient Encounter Prescriptions as of 3/21/2018   Medication Sig Dispense Refill   • atorvastatin (LIPITOR) 10 MG tablet Take 10 mg by mouth Daily.     • atorvastatin (LIPITOR) 20 MG tablet      • clonazePAM (KlonoPIN) 0.5 MG tablet      • Ergocalciferol (VITAMIN D2) 2000 UNITS tablet Take 50,000 Units by mouth Every 14 (Fourteen) Days. 8 tablet 2   • glucagon (GLUCAGON EMERGENCY) 1 MG injection Inject 1 mg under the skin 1 (one) time as needed for low blood sugar.     • glucose blood test strip 1 each by Other route 6 (six) times a day. Use as instructed     • insulin lispro (humaLOG) 100 UNIT/ML injection Inject  under the skin.     • levothyroxine (SYNTHROID, LEVOTHROID) 100 MCG tablet Take 100 mcg by mouth.     • naproxen (EC NAPROSYN) 500 MG EC tablet Take 1 tablet by mouth 2 (Two) Times a Day As Needed for Mild Pain (1-3). 30 tablet 0   • pravastatin (PRAVACHOL) 20 MG tablet Take 1 tablet by mouth Every Evening. 30 tablet 11   • ramipril (ALTACE) 5 MG capsule Take 5 mg by mouth daily.     • Urine Glucose-Ketones Test strip Use as indicated     • venlafaxine XR (EFFEXOR-XR) 75 MG 24 hr capsule      • vitamin D (ERGOCALCIFEROL) 65938 units capsule capsule      •  [DISCONTINUED] oseltamivir (TAMIFLU) 75 MG capsule Take 1 capsule by mouth 2 (Two) Times a Day. 10 capsule 0   • [DISCONTINUED] oseltamivir (TAMIFLU) 75 MG capsule Take 1 capsule by mouth 2 (Two) Times a Day. 10 capsule 0     No facility-administered encounter medications on file as of 3/21/2018.        Orders placed during this encounter include:  No orders of the defined types were placed in this encounter.    Glycemic Management      Insulin Pump         before pump      lantus 25 units at night        Humalog 5 to 15 based on experience  ---     now on omnipod     basal    change MN to 11 a.m. -  0.55  8 a.m. - 0.75  10:30 p.m. -0.5            bolusing by experience     using dexcom     bolusing after meals     try to bolus before meals     no need for carb counitng as long as 2 hour post rise doesn't exceed 180         insurance doesn't want to pay for freestyle     she is checking with a different meter and having to enter that into omnipod and that , of course, results in underdocumentation when she forgets to enter. This is particularly true with low glucose     needs freestyle     Patient has Ketone Strips     Patient was advised to go to ER if BG is more than 250, ketones are present and symptoms are present      Hypoglycemia Management     Discussed Rule of 15   Patent Has Glucagon Pen      Lipid Management              Lab Results   Component Value Date     CHOL 220 (H) 09/13/2017            Lab Results   Component Value Date     TRIG 144 09/13/2017     TRIG 167 02/05/2016            Lab Results   Component Value Date     HDL 51 (L) 09/13/2017     HDL 52 (L) 02/05/2016            Lab Results   Component Value Date     LDLCALC 133 (H) 02/05/2016       pravastatin 20 mg qhs-- changed to Lipitor 20 mg         Blood Pressure Management     Patient is on ACE inhibitor  On altace and bp controlled    Rechecked bp 128/78     Microvascular Complications     Last Eye Exam      Microalbuminuria present  Patient is  on ACE inhibitor     Diabetic Neuropathy absent     Weight  Management       BMI - 26.9        General weight loss/lifestyle modification strategies discussed (elicit support from others; identify saboteurs; non-food rewards, etc).        Smoking Cessation          Social History   Substance Use Topics   • Smoking status: Never Smoker   • Smokeless tobacco: Never Used   • Alcohol use No                  Bone / Calcium     Vit d def , 2-16 at 24     On vit d 50 th u q 2 w      Thyroid            Lab Results   Component Value Date     TSH 4.220 09/13/2017      On brand name synthroid 125 , skip one day per week until you run out   chage to 100         B12           Lab Results   Component Value Date     BXMLJJSF11 291 09/13/2017            Immunizations     Has Had Pneumovax no  Has Had Prevnar 13no        Depression, Dr. Maldonado tried celexa , no effect  She questions alt medicine or possibility of ADHD            4. Follow-up: Return in about 3 months (around 6/21/2018) for Recheck.

## 2018-05-07 RX ORDER — ERGOCALCIFEROL 1.25 MG/1
CAPSULE ORAL
Qty: 2 CAPSULE | Refills: 1 | Status: SHIPPED | OUTPATIENT
Start: 2018-05-07 | End: 2018-07-02 | Stop reason: SDUPTHER

## 2018-07-02 RX ORDER — ERGOCALCIFEROL 1.25 MG/1
CAPSULE ORAL
Qty: 2 CAPSULE | Refills: 0 | Status: SHIPPED | OUTPATIENT
Start: 2018-07-02 | End: 2018-08-08 | Stop reason: SDUPTHER

## 2018-07-23 ENCOUNTER — TRANSCRIBE ORDERS (OUTPATIENT)
Dept: ORTHOPEDIC SURGERY | Facility: CLINIC | Age: 48
End: 2018-07-23

## 2018-07-23 DIAGNOSIS — M79.642 HAND PAIN, LEFT: Primary | ICD-10-CM

## 2018-08-01 DIAGNOSIS — M79.642 LEFT HAND PAIN: Primary | ICD-10-CM

## 2018-08-08 RX ORDER — ERGOCALCIFEROL 1.25 MG/1
CAPSULE ORAL
Qty: 2 CAPSULE | Refills: 5 | Status: SHIPPED | OUTPATIENT
Start: 2018-08-08 | End: 2019-02-27 | Stop reason: SDUPTHER

## 2018-11-20 ENCOUNTER — OFFICE VISIT (OUTPATIENT)
Dept: ORTHOPEDIC SURGERY | Facility: CLINIC | Age: 48
End: 2018-11-20

## 2018-11-20 VITALS — WEIGHT: 142.6 LBS | HEIGHT: 62 IN | BODY MASS INDEX: 26.24 KG/M2

## 2018-11-20 DIAGNOSIS — M79.645 PAIN OF LEFT MIDDLE FINGER: Primary | ICD-10-CM

## 2018-11-20 DIAGNOSIS — E11.9 TYPE 2 DIABETES MELLITUS WITHOUT COMPLICATION, WITH LONG-TERM CURRENT USE OF INSULIN (HCC): ICD-10-CM

## 2018-11-20 DIAGNOSIS — M79.642 LEFT HAND PAIN: ICD-10-CM

## 2018-11-20 DIAGNOSIS — M79.632 LEFT FOREARM PAIN: ICD-10-CM

## 2018-11-20 DIAGNOSIS — Z79.4 TYPE 2 DIABETES MELLITUS WITHOUT COMPLICATION, WITH LONG-TERM CURRENT USE OF INSULIN (HCC): ICD-10-CM

## 2018-11-20 DIAGNOSIS — M79.602 LEFT ARM PAIN: Primary | ICD-10-CM

## 2018-11-20 DIAGNOSIS — S52.225G: ICD-10-CM

## 2018-11-20 PROCEDURE — 99203 OFFICE O/P NEW LOW 30 MIN: CPT | Performed by: ORTHOPAEDIC SURGERY

## 2018-11-20 RX ORDER — MELOXICAM 7.5 MG/1
15 TABLET ORAL DAILY
Qty: 30 TABLET | Refills: 1 | Status: SHIPPED | OUTPATIENT
Start: 2018-11-20 | End: 2019-01-24 | Stop reason: SDUPTHER

## 2018-11-20 NOTE — PROGRESS NOTES
Suzy Coyne is a 48 y.o. female returns for     Chief Complaint   Patient presents with   • Left Forearm - Pain   • Establish Care       HISTORY OF PRESENT ILLNESS: Patient being seen for left forearm pain due to injury occurring 8/11/18. X-rays done today.   Also with pain in left middle finger.  Diabetic.  No specific injury.  Has had some catching and locking.    Injured left arm on [place] in august.  Still having pain.  Maybe getting a little better.  Wearing a brace for support.  No new complaints.  Slowly using arm more.  No numbness or tingling.       CONCURRENT MEDICAL HISTORY:    Past Medical History:   Diagnosis Date   • Colitis    • Essential (primary) hypertension    • Hashimoto's thyroiditis    • Hypoglycemia    • Microalbuminuria    • Type I diabetes mellitus, uncontrolled (CMS/[facility])    • Vitamin D deficiency        No Known Allergies    Current Outpatient Medications on File Prior to Visit   Medication Sig   • clonazePAM (KlonoPIN) 0.5 MG tablet    • glucagon (GLUCAGON EMERGENCY) 1 MG injection Inject 1 mg under the skin 1 (one) time as needed for low blood sugar.   • glucose blood test strip 1 each by Other route 6 (six) times a day. Use as instructed   • insulin lispro (humaLOG) 100 UNIT/ML injection Inject  under the skin.   • levothyroxine (SYNTHROID, LEVOTHROID) 100 MCG tablet Take 100 mcg by mouth.   • ramipril (ALTACE) 5 MG capsule Take 5 mg by mouth daily.   • Urine Glucose-Ketones Test strip Use as indicated   • venlafaxine XR (EFFEXOR-XR) 75 MG 24 hr capsule    • vitamin D (ERGOCALCIFEROL) 50676 units capsule capsule TAKE 1 CAPSULE BY MOUTH ONCE EVERY 2 WEEKS     No current facility-administered medications on file prior to visit.        Past Surgical History:   Procedure Laterality Date   • FRACTURE SURGERY Right 05/2001    right arm       Family History   Problem Relation Age of Onset   • Diabetes Other        Social History     Socioeconomic History   • Marital status:      " Spouse name: Not on file   • Number of children: Not on file   • Years of education: Not on file   • Highest education level: Not on file   Social Needs   • Financial resource strain: Not on file   • Food insecurity - worry: Not on file   • Food insecurity - inability: Not on file   • Transportation needs - medical: Not on file   • Transportation needs - non-medical: Not on file   Occupational History   • Not on file   Tobacco Use   • Smoking status: Never Smoker   • Smokeless tobacco: Never Used   Substance and Sexual Activity   • Alcohol use: No   • Drug use: No   • Sexual activity: Not on file   Other Topics Concern   • Not on file   Social History Narrative   • Not on file           ROS  No fevers or chills.  No chest pain or shortness of air.  No GI or  disturbances.  Other than left arm and hand pain, all other systems reviewed as negative.    PHYSICAL EXAMINATION:       Ht 157.5 cm (62\")   Wt 64.7 kg (142 lb 9.6 oz)   BMI 26.08 kg/m²     Physical Exam   Constitutional: She is oriented to person, place, and time. She appears well-developed and well-nourished.   Neurological: She is alert and oriented to person, place, and time.   Psychiatric: She has a normal mood and affect. Her behavior is normal. Judgment and thought content normal.       GAIT:     [x]  Normal  []  Antalgic    Assistive device: [x]  None  []  Walker     []  Crutches  []  Cane     []  Wheelchair  []  Stretcher    Left Hand Exam     Comments:  Mild tender at volar aspect of mcp joint 3rd finger.  Good motion except lacks full extension of PIP joint.  Good cap refill  Stable exam  Good strength      Left Elbow Exam     Comments:  Full elbow extension.  Full flexion  Supination 50  Full pronation  Good wrist flex/extension  Mild knot present at fracture site.              Xr Forearm 2 View Left    Result Date: 11/21/2018  Narrative: Ordering Provider:  Wilson Diaz MD Ordering Diagnosis/Indication:  Left forearm pain Procedure:  " XR FOREARM 2 VW LEFT Exam Date:  11/20/18 COMPARISON:  Not applicable, no relevant images available.     Impression:  3 views of the left hand show acceptable position and alignment with no evidence of acute bony abnormality.  No fracture or dislocation is noted. Wilson Diaz MD 11/20/18     Xr Hand 3+ View Left    Result Date: 11/21/2018  Narrative: Ordering Provider:  Wilson Diaz MD Ordering Diagnosis/Indication:  Left hand pain Procedure:  XR HAND 3+ VW LEFT Exam Date:  11/20/18 COMPARISON:  Not applicable, no relevant images available.     Impression:  AP and lateral of the left forearm show acceptable position and alignment of a midshaft ulnar fracture.  Significant bony callus is present with remodeling.  No other acute bony abnormality is noted.  Near complete bony consolidation is present. Wilson Diaz MD 11/20/18             ASSESSMENT:    Diagnoses and all orders for this visit:    Left arm pain  -     Ambulatory Referral to Physical Therapy Evaluate and treat    Type 2 diabetes mellitus without complication, with long-term current use of insulin (CMS/[facility])  -     Ambulatory Referral to Physical Therapy Evaluate and treat    Closed nondisplaced transverse fracture of shaft of left ulna with delayed healing, subsequent encounter  -     Ambulatory Referral to Physical Therapy Evaluate and treat    Left hand pain  -     Ambulatory Referral to Physical Therapy Evaluate and treat    Other orders  -     meloxicam (MOBIC) 7.5 MG tablet; Take 2 tablets by mouth Daily.          PL[facility]    [facility]  Activity and strength as tolerated.  Slowly progress motion and activity as tolerated.  Discussed possible injection into flexor tendon sheath.  Possible ulnar fracture brace  Recheck for 6 weeks left forearm.  Start meloxicam for NSAIDS    Patient's Body mass index is 26.08 kg/m². BMI is within normal parameters. No follow-up required.      Return in about 6  weeks (around 1/1/2019) for Recheck with repeat xrays.    Wilson Diaz MD

## 2019-01-07 DIAGNOSIS — S52.225G: ICD-10-CM

## 2019-01-07 DIAGNOSIS — M79.632 LEFT FOREARM PAIN: Primary | ICD-10-CM

## 2019-01-24 DIAGNOSIS — M79.632 PAIN IN LEFT FOREARM: Primary | ICD-10-CM

## 2019-01-25 RX ORDER — MELOXICAM 7.5 MG/1
TABLET ORAL
Qty: 30 TABLET | Refills: 0 | Status: SHIPPED | OUTPATIENT
Start: 2019-01-25 | End: 2021-08-31

## 2019-02-27 RX ORDER — ERGOCALCIFEROL 1.25 MG/1
CAPSULE ORAL
Qty: 2 CAPSULE | Refills: 4 | Status: SHIPPED | OUTPATIENT
Start: 2019-02-27 | End: 2019-08-08 | Stop reason: SDUPTHER

## 2019-05-21 ENCOUNTER — OFFICE VISIT (OUTPATIENT)
Dept: ENDOCRINOLOGY | Facility: CLINIC | Age: 49
End: 2019-05-21

## 2019-05-21 VITALS
HEIGHT: 60 IN | SYSTOLIC BLOOD PRESSURE: 124 MMHG | HEART RATE: 88 BPM | BODY MASS INDEX: 27.48 KG/M2 | DIASTOLIC BLOOD PRESSURE: 76 MMHG | WEIGHT: 140 LBS

## 2019-05-21 DIAGNOSIS — E10.65 UNCONTROLLED TYPE 1 DIABETES MELLITUS WITH HYPERGLYCEMIA (HCC): Primary | ICD-10-CM

## 2019-05-21 PROCEDURE — 99214 OFFICE O/P EST MOD 30 MIN: CPT | Performed by: NURSE PRACTITIONER

## 2019-05-21 RX ORDER — BUPROPION HYDROCHLORIDE 150 MG/1
TABLET ORAL
COMMUNITY
Start: 2019-05-01 | End: 2021-08-31

## 2019-05-21 NOTE — PROGRESS NOTES
Subjective    Suzy Coyne is a 49 y.o. female. she is here today for follow-up.    History of Present Illness       History of Present Illness  Duration/Timing:  Diabetes mellitus type 1  10 years     constant     Experiencing nocturnal hypoglycemia , severe      Severity (Complications/Hospitalizations)  Secondary Macrovascular Complications:  No CAD, No CVA, No PAD  Secondary Microvascular Complications:  No Diabetic Nephropathy, No proteinuria, No Diabetic Retinopathy, Date of last eye exam 02/05/2014, No Diabetic Neuropathy     Context  Diabetes Regimen:  Insulin, Compliant with regimen           Lab Results   Component Value Date    HGBA1C 7.8 (H) 09/13/2017        states last HgbA1c was high           Blood Glucose Readings     On omni pod insulin pump         Dexcom has run out of supplies     Needs up grade to dexcom G6         hypoglycemia unawareness      Associated Signs/Symptoms  Hyperglycemic Symptoms:  No polyuria, No polydipsia, No polyphagia, No blurred vision, No weight gain  Hypoglycemic Episodes:  Documented symptomatic hypoglycemia, Hypoglycemic unawareness                 The following portions of the patient's history were reviewed and updated as appropriate:   Past Medical History:   Diagnosis Date   • Colitis    • Essential (primary) hypertension    • Hashimoto's thyroiditis    • Hypoglycemia    • Microalbuminuria    • Type I diabetes mellitus, uncontrolled (CMS/[facility])    • Vitamin D deficiency      Past Surgical History:   Procedure Laterality Date   • FRACTURE SURGERY Right 05/2001    right arm     Family History   Problem Relation Age of Onset   • Diabetes Other      OB History     No data available        Current Outpatient Medications   Medication Sig Dispense Refill   • buPROPion XL (WELLBUTRIN XL) 150 MG 24 hr tablet      • clonazePAM (KlonoPIN) 0.5 MG tablet      • glucagon (GLUCAGON EMERGENCY) 1 MG injection Inject 1 mg under the skin 1 (one) time as needed for low blood  sugar.     • glucose blood test strip 1 each by Other route 6 (six) times a day. Use as instructed     • insulin lispro (humaLOG) 100 UNIT/ML injection Inject  under the skin.     • levothyroxine (SYNTHROID, LEVOTHROID) 100 MCG tablet Take 100 mcg by mouth.     • meloxicam (MOBIC) 7.5 MG tablet TAKE 2 TABLETS BY MOUTH EVERY DAY 30 tablet 0   • ramipril (ALTACE) 5 MG capsule Take 5 mg by mouth daily.     • Urine Glucose-Ketones Test strip Use as indicated     • vitamin D (ERGOCALCIFEROL) 67703 units capsule capsule TAKE 1 CAPSULE BY MOUTH ONCE EVERY 2 WEEKS 2 capsule 4     No current facility-administered medications for this visit.      No Known Allergies  Social History     Socioeconomic History   • Marital status:      Spouse name: Not on file   • Number of children: Not on file   • Years of education: Not on file   • Highest education level: Not on file   Tobacco Use   • Smoking status: Never Smoker   • Smokeless tobacco: Never Used   Substance and Sexual Activity   • Alcohol use: No   • Drug use: No       Review of Systems  Review of Systems   Constitutional: Negative for activity change, appetite change, diaphoresis and fatigue.   HENT: Negative for facial swelling, sneezing, sore throat, tinnitus, trouble swallowing and voice change.    Eyes: Negative for photophobia, pain, discharge, redness, itching and visual disturbance.   Respiratory: Negative for apnea, cough, choking, chest tightness and shortness of breath.    Cardiovascular: Negative for chest pain, palpitations and leg swelling.   Gastrointestinal: Negative for abdominal distention, abdominal pain, constipation, diarrhea, nausea and vomiting.   Endocrine: Negative for cold intolerance, heat intolerance, polydipsia, polyphagia and polyuria.   Genitourinary: Negative for difficulty urinating, dysuria, frequency, hematuria and urgency.   Musculoskeletal: Negative for arthralgias, back pain, gait problem, joint swelling, myalgias, neck pain and  "neck stiffness.   Skin: Negative for color change, pallor, rash and wound.   Neurological: Negative for dizziness, tremors, weakness, light-headedness, numbness and headaches.   Hematological: Negative for adenopathy. Does not bruise/bleed easily.   Psychiatric/Behavioral: Negative for behavioral problems, confusion and sleep disturbance.        Objective    /76 (BP Location: Right arm, Patient Position: Sitting, Cuff Size: Adult)   Pulse 88   Ht 152.4 cm (60\")   Wt 63.5 kg (140 lb)   BMI 27.34 kg/m²   Physical Exam   Constitutional: She is oriented to person, place, and time. She appears well-developed and well-nourished. No distress.   HENT:   Head: Normocephalic and atraumatic.   Right Ear: External ear normal.   Left Ear: External ear normal.   Nose: Nose normal.   Eyes: Conjunctivae and EOM are normal. Pupils are equal, round, and reactive to light.   Neck: Normal range of motion. Neck supple. No tracheal deviation present. No thyromegaly present.   Cardiovascular: Normal rate, regular rhythm and normal heart sounds.   No murmur heard.  Pulmonary/Chest: Effort normal and breath sounds normal. No respiratory distress. She has no wheezes.   Abdominal: Soft. Bowel sounds are normal. There is no tenderness. There is no rebound and no guarding.   Musculoskeletal: Normal range of motion. She exhibits no edema, tenderness or deformity.   Neurological: She is alert and oriented to person, place, and time. No cranial nerve deficit.   Skin: Skin is warm and dry. No rash noted.   Psychiatric: She has a normal mood and affect. Her behavior is normal. Judgment and thought content normal.       Lab Review  Glucose   Date Value   09/13/2017 139 mg/dL (H)   08/02/2016 170 mg/dl (H)   02/05/2016 145 mg/dl (H)     Sodium (mmol/L)   Date Value   09/13/2017 131 (L)   08/02/2016 138   02/05/2016 138     Potassium (mmol/L)   Date Value   09/13/2017 4.6   08/02/2016 4.8   02/05/2016 4.5     Chloride (mmol/L)   Date Value "   09/13/2017 97   08/02/2016 103   02/05/2016 100     CO2 (mmol/L)   Date Value   09/13/2017 25.0   08/02/2016 28   02/05/2016 25     BUN   Date Value   09/13/2017 8 mg/dL   08/02/2016 12 mg/dl   02/05/2016 8 mg/dl     Creatinine   Date Value   09/13/2017 0.95 mg/dL   08/02/2016 0.8 mg/dl   02/05/2016 0.6 mg/dl     Hemoglobin A1C   Date Value   09/13/2017 7.8 % (H)   08/02/2016 7.8 %TotHgb (H)   02/05/2016 8.5 %TotHgb (H)     Triglycerides   Date Value   09/13/2017 144 mg/dL   02/05/2016 167 mg/dl     LDL Cholesterol    Date Value   09/13/2017 145 mg/dL (H)   02/05/2016 133 mg/dl (H)       Assessment/Plan      1. Uncontrolled type 1 diabetes mellitus with hyperglycemia (CMS/[facility])    .    Medications prescribed:  Outpatient Encounter Medications as of 5/21/2019   Medication Sig Dispense Refill   • buPROPion XL (WELLBUTRIN XL) 150 MG 24 hr tablet      • clonazePAM (KlonoPIN) 0.5 MG tablet      • glucagon (GLUCAGON EMERGENCY) 1 MG injection Inject 1 mg under the skin 1 (one) time as needed for low blood sugar.     • glucose blood test strip 1 each by Other route 6 (six) times a day. Use as instructed     • insulin lispro (humaLOG) 100 UNIT/ML injection Inject  under the skin.     • levothyroxine (SYNTHROID, LEVOTHROID) 100 MCG tablet Take 100 mcg by mouth.     • meloxicam (MOBIC) 7.5 MG tablet TAKE 2 TABLETS BY MOUTH EVERY DAY 30 tablet 0   • ramipril (ALTACE) 5 MG capsule Take 5 mg by mouth daily.     • Urine Glucose-Ketones Test strip Use as indicated     • vitamin D (ERGOCALCIFEROL) 62200 units capsule capsule TAKE 1 CAPSULE BY MOUTH ONCE EVERY 2 WEEKS 2 capsule 4   • [DISCONTINUED] venlafaxine XR (EFFEXOR-XR) 75 MG 24 hr capsule        No facility-administered encounter medications on file as of 5/21/2019.        Orders placed during this encounter include:  No orders of the defined types were placed in this encounter.        Glycemic Management      Insulin Pump         before pump      lantus 25 units at night         Humalog 5 to 15 based on experience  ---     now on omnipod     basal    change MN to 11 a.m. -  0.55  8 a.m. - 0.75  10:30 p.m. -0.5            bolusing by experience     using dexcom     bolusing after meals     try to bolus before meals     no need for carb counitng as long as 2 hour post rise doesn't exceed 180         insurance doesn't want to pay for freestyle     she is checking with a different meter and having to enter that into omnipod and that , of course, results in underdocumentation when she forgets to enter. This is particularly true with low glucose     needs freestyle     Patient has Ketone Strips     Patient was advised to go to ER if BG is more than 250, ketones are present and symptoms are present      Hypoglycemia Management     Discussed Rule of 15   Patent Has Glucagon Pen      Lipid Management                  Lab Results   Component Value Date     CHOL 220 (H) 09/13/2017                Lab Results   Component Value Date     TRIG 144 09/13/2017     TRIG 167 02/05/2016                Lab Results   Component Value Date     HDL 51 (L) 09/13/2017     HDL 52 (L) 02/05/2016                Lab Results   Component Value Date     LDLCALC 133 (H) 02/05/2016       pravastatin 20 mg qhs-- changed to Lipitor 20 mg         Blood Pressure Management     Patient is on ACE inhibitor  On altace and bp controlled     Rechecked bp 128/78     Microvascular Complications     Last Eye Exam      Microalbuminuria present  Patient is on ACE inhibitor     Diabetic Neuropathy absent     Weight  Management           Patient's Body mass index is 27.34 kg/m². BMI is above normal parameters. Recommendations include: educational material.          Bone / Calcium     Vit d def , 2-16 at 24     On vit d 50 th u q 2 w      Thyroid                Lab Results   Component Value Date     TSH 4.220 09/13/2017      On brand name synthroid 125 , skip one day per week until you run out   chage to 100       April 2019     TSH  normal per patient         B12               Lab Results   Component Value Date     [id number] 291 09/13/2017            Immunizations     Has Had Pneumovax no  Has Had Prevnar 13no        Depression, Dr. Maldonado tried celexa , no effect  She questions alt medicine or possibility of ADHD               4. Follow-up: Return in about 6 months (around 11/21/2019) for Recheck.

## 2019-06-04 RX ORDER — INSULIN GLARGINE 100 [IU]/ML
25 INJECTION, SOLUTION SUBCUTANEOUS DAILY
Qty: 9 ML | Refills: 11 | Status: SHIPPED | OUTPATIENT
Start: 2019-06-04 | End: 2020-05-15

## 2019-06-12 ENCOUNTER — TELEPHONE (OUTPATIENT)
Dept: ENDOCRINOLOGY | Facility: CLINIC | Age: 49
End: 2019-06-12

## 2019-06-12 NOTE — TELEPHONE ENCOUNTER
Spoke with patient. She is currently using Dexcom G5 on her mobile gladys. Instructed to download [employer]arity gladys and sent sharing code. Patient is now sharing with clinic. Instructed how to view reports and send email to dexcom for bg logs. Patient did all of this while on the phone.

## 2019-06-12 NOTE — TELEPHONE ENCOUNTER
Pt called, asking for Lizett   said Dexcom notified her that they need a log but she doesn't have one do you have one that you can email her so she can send that in. Dexcom said they have to have it to process the order for the Dexcom her email is     [email or web address]  Thank you    Call [phone]

## 2019-08-08 RX ORDER — ERGOCALCIFEROL 1.25 MG/1
CAPSULE ORAL
Qty: 2 CAPSULE | Refills: 3 | Status: SHIPPED | OUTPATIENT
Start: 2019-08-08 | End: 2020-01-06

## 2019-12-04 ENCOUNTER — TELEPHONE (OUTPATIENT)
Dept: ENDOCRINOLOGY | Facility: CLINIC | Age: 49
End: 2019-12-04

## 2020-01-06 RX ORDER — ERGOCALCIFEROL 1.25 MG/1
CAPSULE ORAL
Qty: 2 CAPSULE | Refills: 0 | Status: SHIPPED | OUTPATIENT
Start: 2020-01-06 | End: 2020-02-06

## 2020-01-28 ENCOUNTER — OFFICE VISIT (OUTPATIENT)
Dept: ENDOCRINOLOGY | Facility: CLINIC | Age: 50
End: 2020-01-28

## 2020-01-28 VITALS
HEIGHT: 60 IN | WEIGHT: 143.8 LBS | SYSTOLIC BLOOD PRESSURE: 126 MMHG | BODY MASS INDEX: 28.23 KG/M2 | DIASTOLIC BLOOD PRESSURE: 78 MMHG | HEART RATE: 97 BPM | OXYGEN SATURATION: 98 %

## 2020-01-28 DIAGNOSIS — E10.65 TYPE 1 DIABETES MELLITUS WITH HYPERGLYCEMIA (HCC): Primary | ICD-10-CM

## 2020-01-28 DIAGNOSIS — E06.3 HASHIMOTO'S THYROIDITIS: ICD-10-CM

## 2020-01-28 PROCEDURE — 99214 OFFICE O/P EST MOD 30 MIN: CPT | Performed by: NURSE PRACTITIONER

## 2020-01-28 PROCEDURE — 95251 CONT GLUC MNTR ANALYSIS I&R: CPT | Performed by: NURSE PRACTITIONER

## 2020-01-28 RX ORDER — ATORVASTATIN CALCIUM 20 MG/1
20 TABLET, FILM COATED ORAL DAILY
COMMUNITY
Start: 2020-01-06

## 2020-01-28 RX ORDER — ONDANSETRON HYDROCHLORIDE 8 MG/1
8 TABLET, FILM COATED ORAL AS NEEDED
COMMUNITY
Start: 2019-10-22

## 2020-01-28 NOTE — PROGRESS NOTES
Subjective    Suzy Coyne is a 50 y.o. female. she is here today for follow-up.    History of Present Illness       History of Present Illness  Duration/Timing:  Diabetes mellitus type 1  10 years     constant     Experiencing nocturnal hypoglycemia , severe      Severity (Complications/Hospitalizations)  Secondary Macrovascular Complications:  No CAD, No CVA, No PAD  Secondary Microvascular Complications:  No Diabetic Nephropathy, No proteinuria, No Diabetic Retinopathy, Date of last eye exam 02/05/2014, No Diabetic Neuropathy     Context  Diabetes Regimen:  Insulin, Compliant with regimen           Last HgbA1c 9% in Dec. 2019            Blood Glucose Readings     On omni pod insulin pump           Dexcom G6     Downloaded and reviewed form David. 15 - Jan. 28, 2020    Average 206    36% in target     63% above target     2% low           hypoglycemia unawareness      Associated Signs/Symptoms    Hyperglycemic Symptoms:  No polyuria, No polydipsia, No polyphagia, No blurred vision, No weight gain  Hypoglycemic Episodes:  Documented symptomatic hypoglycemia, Hypoglycemic unawareness                  The following portions of the patient's history were reviewed and updated as appropriate:   Past Medical History:   Diagnosis Date   • Colitis    • Essential (primary) hypertension    • Hashimoto's thyroiditis    • Hypoglycemia    • Microalbuminuria    • Type I diabetes mellitus, uncontrolled (CMS/[facility])    • Vitamin D deficiency      Past Surgical History:   Procedure Laterality Date   • FRACTURE SURGERY Right 05/2001    right arm     Family History   Problem Relation Age of Onset   • Diabetes Other      OB History    None       Current Outpatient Medications   Medication Sig Dispense Refill   • atorvastatin (LIPITOR) 20 MG tablet Take 20 mg by mouth Daily.     • buPROPion XL (WELLBUTRIN XL) 150 MG 24 hr tablet      • clonazePAM (KlonoPIN) 0.5 MG tablet      • glucagon (GLUCAGON EMERGENCY) 1 MG injection Inject 1  mg under the skin 1 (one) time as needed for low blood sugar.     • glucose blood test strip 1 each by Other route 6 (six) times a day. Use as instructed     • Insulin Glargine (BASAGLAR KWIKPEN) 100 UNIT/ML injection pen Inject 25 Units under the skin into the appropriate area as directed Daily. 9 mL 11   • insulin lispro (humaLOG) 100 UNIT/ML injection Inject  under the skin.     • levothyroxine (SYNTHROID, LEVOTHROID) 100 MCG tablet Take 100 mcg by mouth.     • meloxicam (MOBIC) 7.5 MG tablet TAKE 2 TABLETS BY MOUTH EVERY DAY 30 tablet 0   • ondansetron (ZOFRAN) 8 MG tablet Take 8 mg by mouth As Needed.     • ramipril (ALTACE) 5 MG capsule Take 5 mg by mouth daily.     • Urine Glucose-Ketones Test strip Use as indicated     • vitamin D (ERGOCALCIFEROL) 1.25 MG (98941 UT) capsule capsule TAKE 1 CAPSULE BY MOUTH ONCE EVERY 2 WEEKS 2 capsule 0     No current facility-administered medications for this visit.      No Known Allergies  Social History     Socioeconomic History   • Marital status:      Spouse name: Not on file   • Number of children: Not on file   • Years of education: Not on file   • Highest education level: Not on file   Tobacco Use   • Smoking status: Never Smoker   • Smokeless tobacco: Never Used   Substance and Sexual Activity   • Alcohol use: No   • Drug use: No       Review of Systems  Review of Systems   Constitutional: Negative for activity change, appetite change, diaphoresis and fatigue.   HENT: Negative for facial swelling, sneezing, sore throat, tinnitus, trouble swallowing and voice change.    Eyes: Negative for photophobia, pain, discharge, redness, itching and visual disturbance.   Respiratory: Negative for apnea, cough, choking, chest tightness and shortness of breath.    Cardiovascular: Negative for chest pain, palpitations and leg swelling.   Gastrointestinal: Negative for abdominal distention, abdominal pain, constipation, diarrhea, nausea and vomiting.   Endocrine: Negative  "for cold intolerance, heat intolerance, polydipsia, polyphagia and polyuria.   Genitourinary: Negative for difficulty urinating, dysuria, frequency, hematuria and urgency.   Musculoskeletal: Negative for arthralgias, back pain, gait problem, joint swelling, myalgias, neck pain and neck stiffness.   Skin: Negative for color change, pallor, rash and wound.   Neurological: Negative for dizziness, tremors, weakness, light-headedness, numbness and headaches.   Hematological: Negative for adenopathy. Does not bruise/bleed easily.   Psychiatric/Behavioral: Negative for behavioral problems, confusion and sleep disturbance.        Objective    /78   Pulse 97   Ht 152.4 cm (60\")   Wt 65.2 kg (143 lb 12.8 oz)   SpO2 98%   BMI 28.08 kg/m²   Physical Exam   Constitutional: She is oriented to person, place, and time. She appears well-developed and well-nourished. No distress.   HENT:   Head: Normocephalic and atraumatic.   Right Ear: External ear normal.   Left Ear: External ear normal.   Nose: Nose normal.   Eyes: Pupils are equal, round, and reactive to light. Conjunctivae and EOM are normal.   Neck: Normal range of motion. Neck supple. No tracheal deviation present. No thyromegaly present.   Cardiovascular: Normal rate, regular rhythm and normal heart sounds.   No murmur heard.  Pulmonary/Chest: Effort normal and breath sounds normal. No respiratory distress. She has no wheezes.   Abdominal: Soft. Bowel sounds are normal. There is no tenderness. There is no rebound and no guarding.   Musculoskeletal: Normal range of motion. She exhibits no edema, tenderness or deformity.   Neurological: She is alert and oriented to person, place, and time. No cranial nerve deficit.   Skin: Skin is warm and dry. No rash noted.   Psychiatric: She has a normal mood and affect. Her behavior is normal. Judgment and thought content normal.       Lab Review  Glucose   Date Value   09/13/2017 139 mg/dL (H)   08/02/2016 170 mg/dl (H) "   02/05/2016 145 mg/dl (H)     Sodium (mmol/L)   Date Value   09/13/2017 131 (L)   08/02/2016 138   02/05/2016 138     Potassium (mmol/L)   Date Value   09/13/2017 4.6   08/02/2016 4.8   02/05/2016 4.5     Chloride (mmol/L)   Date Value   09/13/2017 97   08/02/2016 103   02/05/2016 100     CO2 (mmol/L)   Date Value   09/13/2017 25.0   08/02/2016 28   02/05/2016 25     BUN   Date Value   09/13/2017 8 mg/dL   08/02/2016 12 mg/dl   02/05/2016 8 mg/dl     Creatinine   Date Value   09/13/2017 0.95 mg/dL   08/02/2016 0.8 mg/dl   02/05/2016 0.6 mg/dl     Hemoglobin A1C   Date Value   09/13/2017 7.8 % (H)   08/02/2016 7.8 %TotHgb (H)   02/05/2016 8.5 %TotHgb (H)     Triglycerides   Date Value   09/13/2017 144 mg/dL   02/05/2016 167 mg/dl     LDL Cholesterol    Date Value   09/13/2017 145 mg/dL (H)   02/05/2016 133 mg/dl (H)       Assessment/Plan      1. Type 1 diabetes mellitus with hyperglycemia (CMS/HCC)    2. Hashimoto's thyroiditis    .    Medications prescribed:  Outpatient Encounter Medications as of 1/28/2020   Medication Sig Dispense Refill   • atorvastatin (LIPITOR) 20 MG tablet Take 20 mg by mouth Daily.     • buPROPion XL (WELLBUTRIN XL) 150 MG 24 hr tablet      • clonazePAM (KlonoPIN) 0.5 MG tablet      • glucagon (GLUCAGON EMERGENCY) 1 MG injection Inject 1 mg under the skin 1 (one) time as needed for low blood sugar.     • glucose blood test strip 1 each by Other route 6 (six) times a day. Use as instructed     • Insulin Glargine (BASAGLAR KWIKPEN) 100 UNIT/ML injection pen Inject 25 Units under the skin into the appropriate area as directed Daily. 9 mL 11   • insulin lispro (humaLOG) 100 UNIT/ML injection Inject  under the skin.     • levothyroxine (SYNTHROID, LEVOTHROID) 100 MCG tablet Take 100 mcg by mouth.     • meloxicam (MOBIC) 7.5 MG tablet TAKE 2 TABLETS BY MOUTH EVERY DAY 30 tablet 0   • ondansetron (ZOFRAN) 8 MG tablet Take 8 mg by mouth As Needed.     • ramipril (ALTACE) 5 MG capsule Take 5 mg by  mouth daily.     • Urine Glucose-Ketones Test strip Use as indicated     • vitamin D (ERGOCALCIFEROL) 1.25 MG (32104 UT) capsule capsule TAKE 1 CAPSULE BY MOUTH ONCE EVERY 2 WEEKS 2 capsule 0     No facility-administered encounter medications on file as of 1/28/2020.        Orders placed during this encounter include:  No orders of the defined types were placed in this encounter.    Glycemic Management     Diabetes mellitus type 1 not controlled due to hyperglycemia and hypglycemia         Last HgbA1c 9% in Dec. 2019       Dexcom G6     Downloaded and reviewed form David. 15 - Jan. 28, 2020    Average 206    36% in target     63% above target     2% low      Insulin Pump              now on omnipod     basal        MN - 0.600  8 am - 0.750      Carb ratio        Doing set doses     Gives 3 units       correction     Uses her own sliding scale     1 unit for every 100 above 200            bolusing by experience     using dexcom       bolusing after meals     try to bolus before meals     no need for carb counitng as long as 2 hour post rise doesn't exceed 180       before pump      lantus 25 units at night        Humalog 5 to 15 based on experience  insurance doesn't want to pay for freestyle      ---------------------------------------------------------------------     she is checking with a different meter and having to enter that into omnipod and that , of course, results in underdocumentation when she forgets to enter. This is particularly true with low glucose     needs freestyle     Patient has Ketone Strips     Patient was advised to go to ER if BG is more than 250, ketones are present and symptoms are present      Hypoglycemia Management     Discussed Rule of 15   Patent Has Glucagon Pen      Lipid Management                  Lab Results   Component Value Date     CHOL 220 (H) 09/13/2017                Lab Results   Component Value Date     TRIG 144 09/13/2017     TRIG 167 02/05/2016                Lab Results    Component Value Date     HDL 51 (L) 09/13/2017     HDL 52 (L) 02/05/2016                Lab Results   Component Value Date     LDLCALC 133 (H) 02/05/2016       Lipitor 20 mg         Blood Pressure Management     Patient is on ACE inhibitor  On altace and bp controlled          Microvascular Complications     Last Eye Exam      Microalbuminuria present      Patient is on ACE inhibitor     Diabetic Neuropathy absent     Weight  Management         Patient's Body mass index is 28.08 kg/m². BMI is above normal parameters. Recommendations include: educational material.    Decrease daily caloric intake by 500 calories per day                Bone / Calcium     Vit d def , 2-16 at 24     On vit d 50 th u q 2 w      Thyroid                Lab Results   Component Value Date     TSH 4.220 09/13/2017      On brand name synthroid 125 , skip one day per week until you run out   chage to 100         Dec. 2019      TSH normal per patient         B12               Lab Results   Component Value Date     [id number] 291 09/13/2017            Immunizations     Has Had Pneumovax no  Has Had Prevnar 13no                4. Follow-up: Return in about 4 months (around 5/28/2020) for Recheck.

## 2020-02-06 RX ORDER — ERGOCALCIFEROL 1.25 MG/1
CAPSULE ORAL
Qty: 2 CAPSULE | Refills: 0 | Status: SHIPPED | OUTPATIENT
Start: 2020-02-06 | End: 2020-03-19

## 2020-03-19 RX ORDER — ERGOCALCIFEROL 1.25 MG/1
CAPSULE ORAL
Qty: 2 CAPSULE | Refills: 1 | Status: SHIPPED | OUTPATIENT
Start: 2020-03-19 | End: 2020-05-20

## 2020-05-15 ENCOUNTER — DOCUMENTATION (OUTPATIENT)
Dept: ENDOCRINOLOGY | Facility: CLINIC | Age: 50
End: 2020-05-15

## 2020-05-15 RX ORDER — INSULIN GLARGINE 100 [IU]/ML
INJECTION, SOLUTION SUBCUTANEOUS
Qty: 15 PEN | Refills: 0 | Status: SHIPPED | OUTPATIENT
Start: 2020-05-15

## 2020-05-20 RX ORDER — ERGOCALCIFEROL 1.25 MG/1
CAPSULE ORAL
Qty: 2 CAPSULE | Refills: 1 | Status: SHIPPED | OUTPATIENT
Start: 2020-05-20 | End: 2020-05-21

## 2020-05-21 RX ORDER — ERGOCALCIFEROL 1.25 MG/1
CAPSULE ORAL
Qty: 2 CAPSULE | Refills: 0 | Status: SHIPPED | OUTPATIENT
Start: 2020-05-21 | End: 2020-07-13

## 2020-06-17 RX ORDER — ERGOCALCIFEROL 1.25 MG/1
CAPSULE ORAL
Qty: 2 CAPSULE | Refills: 0 | OUTPATIENT
Start: 2020-06-17

## 2020-07-13 RX ORDER — ERGOCALCIFEROL 1.25 MG/1
CAPSULE ORAL
Qty: 2 CAPSULE | Refills: 0 | Status: SHIPPED | OUTPATIENT
Start: 2020-07-13 | End: 2020-08-04

## 2020-08-04 RX ORDER — ERGOCALCIFEROL 1.25 MG/1
CAPSULE ORAL
Qty: 2 CAPSULE | Refills: 0 | Status: SHIPPED | OUTPATIENT
Start: 2020-08-04 | End: 2021-08-31 | Stop reason: SDUPTHER

## 2020-11-02 ENCOUNTER — DOCUMENTATION (OUTPATIENT)
Dept: ENDOCRINOLOGY | Facility: CLINIC | Age: 50
End: 2020-11-02

## 2020-11-30 ENCOUNTER — DOCUMENTATION (OUTPATIENT)
Dept: ENDOCRINOLOGY | Facility: CLINIC | Age: 50
End: 2020-11-30

## 2021-06-01 ENCOUNTER — TELEPHONE (OUTPATIENT)
Dept: ENDOCRINOLOGY | Facility: CLINIC | Age: 51
End: 2021-06-01

## 2021-06-01 NOTE — TELEPHONE ENCOUNTER
[facility] sent request for chart notes and CMN      [phone] ask for documentation   Fax [phone]

## 2021-06-08 ENCOUNTER — TELEPHONE (OUTPATIENT)
Dept: ENDOCRINOLOGY | Facility: CLINIC | Age: 51
End: 2021-06-08

## 2021-06-08 DIAGNOSIS — E55.9 VITAMIN D DEFICIENCY: ICD-10-CM

## 2021-06-08 DIAGNOSIS — E78.2 MIXED HYPERLIPIDEMIA: ICD-10-CM

## 2021-06-08 DIAGNOSIS — E06.3 HASHIMOTO'S THYROIDITIS: ICD-10-CM

## 2021-06-08 DIAGNOSIS — I10 ESSENTIAL (PRIMARY) HYPERTENSION: ICD-10-CM

## 2021-06-08 DIAGNOSIS — E10.65 UNCONTROLLED TYPE 1 DIABETES MELLITUS WITH HYPERGLYCEMIA (HCC): Primary | ICD-10-CM

## 2021-07-28 ENCOUNTER — DOCUMENTATION (OUTPATIENT)
Dept: ENDOCRINOLOGY | Facility: CLINIC | Age: 51
End: 2021-07-28

## 2021-08-31 ENCOUNTER — TELEPHONE (OUTPATIENT)
Dept: ENDOCRINOLOGY | Facility: CLINIC | Age: 51
End: 2021-08-31

## 2021-08-31 ENCOUNTER — OFFICE VISIT (OUTPATIENT)
Dept: ENDOCRINOLOGY | Facility: CLINIC | Age: 51
End: 2021-08-31

## 2021-08-31 VITALS
HEART RATE: 97 BPM | SYSTOLIC BLOOD PRESSURE: 121 MMHG | BODY MASS INDEX: 25.76 KG/M2 | DIASTOLIC BLOOD PRESSURE: 82 MMHG | OXYGEN SATURATION: 100 % | WEIGHT: 131.2 LBS | HEIGHT: 60 IN

## 2021-08-31 DIAGNOSIS — E06.3 HASHIMOTO'S THYROIDITIS: ICD-10-CM

## 2021-08-31 DIAGNOSIS — E55.9 VITAMIN D DEFICIENCY: Primary | ICD-10-CM

## 2021-08-31 DIAGNOSIS — E10.65 TYPE 1 DIABETES MELLITUS WITH HYPERGLYCEMIA (HCC): ICD-10-CM

## 2021-08-31 DIAGNOSIS — F17.200 SMOKER: ICD-10-CM

## 2021-08-31 PROCEDURE — 99214 OFFICE O/P EST MOD 30 MIN: CPT | Performed by: NURSE PRACTITIONER

## 2021-08-31 RX ORDER — ARIPIPRAZOLE 5 MG/1
5 TABLET ORAL DAILY
COMMUNITY

## 2021-08-31 RX ORDER — PROCHLORPERAZINE 25 MG/1
1 SUPPOSITORY RECTAL ONCE
Qty: 1 EACH | Refills: 1 | Status: SHIPPED | OUTPATIENT
Start: 2021-08-31 | End: 2021-08-31

## 2021-08-31 RX ORDER — VENLAFAXINE HYDROCHLORIDE 75 MG/1
75 CAPSULE, EXTENDED RELEASE ORAL DAILY
COMMUNITY

## 2021-08-31 RX ORDER — PROCHLORPERAZINE 25 MG/1
1 SUPPOSITORY RECTAL AS NEEDED
Qty: 9 EACH | Refills: 3 | Status: SHIPPED | OUTPATIENT
Start: 2021-08-31

## 2021-08-31 RX ORDER — GLUCAGON INJECTION, SOLUTION 1 MG/.2ML
1 INJECTION, SOLUTION SUBCUTANEOUS AS NEEDED
Qty: 0.4 ML | Refills: 11 | Status: SHIPPED | OUTPATIENT
Start: 2021-08-31 | End: 2021-09-01

## 2021-08-31 RX ORDER — ERGOCALCIFEROL 1.25 MG/1
50000 CAPSULE ORAL
Qty: 6 CAPSULE | Refills: 11 | Status: SHIPPED | OUTPATIENT
Start: 2021-08-31

## 2021-08-31 RX ORDER — PROCHLORPERAZINE 25 MG/1
1 SUPPOSITORY RECTAL
Qty: 1 EACH | Refills: 3 | Status: SHIPPED | OUTPATIENT
Start: 2021-08-31

## 2021-08-31 NOTE — PROGRESS NOTES
"Chief Complaint  Diabetes    Subjective          Suzy Jemima Coyne presents to [facility] ENDOCRINOLOGY  History of Present Illness       In office visit    51-year-old female presents for follow-up    Reason diabetes mellitus type 1    Duration diagnosed in 2011    Timing is constant    Quality not controlled    Severity is moderate    Macrovascular complications none    Microvascular complications none    Current diabetes regimen insulin    Uses the Omni pod insulin pump    Current glucose monitoring    Fingersticks    Checks 4 times daily    History of hypoglycemia unawareness        Review of Systems - General ROS: negative              Objective   Vital Signs:   /82   Pulse 97   Ht 152.4 cm (60\")   Wt 59.5 kg (131 lb 3.2 oz)   SpO2 100%   BMI 25.62 kg/m²     Physical Exam  Constitutional:       Appearance: Normal appearance.   Cardiovascular:      Rate and Rhythm: Regular rhythm.      Heart sounds: Normal heart sounds.   Pulmonary:      Breath sounds: Normal breath sounds.   Musculoskeletal:      Cervical back: Normal range of motion.   Neurological:      Mental Status: She is alert.        Result Review :   The following data was reviewed by: OMI Duong on 08/31/2021:                Assessment and Plan    Diagnoses and all orders for this visit:    1. Vitamin D deficiency (Primary)    2. Type 1 diabetes mellitus with hyperglycemia (CMS/[facility])    3. Hashimoto's thyroiditis    4. Smoker    Other orders  -     Continuous Blood Gluc Transmit (Dexcom G6 Transmitter) misc; 1 each Every 3 (Three) Months.  Dispense: 1 each; Refill: 3  -     Continuous Blood Gluc Sensor (Dexcom G6 Sensor); 1 each As Needed (glucose control). Every 10 days  Dispense: 9 each; Refill: 3  -     Continuous Blood Gluc  (Dexcom G6 ) device; 1 each 1 (One) Time for 1 dose.  Dispense: 1 each; Refill: 1  -     Glucagon (Gvoke PFS) 1 MG/0.2ML solution prefilled syringe; Inject 1 mg " under the skin into the appropriate area as directed As Needed (hypoglycemia) for up to 1 day.  Dispense: 0.4 mL; Refill: 11  -     vitamin D (ERGOCALCIFEROL) 1.25 MG (60055 UT) capsule capsule; Take 1 capsule by mouth Every 14 (Fourteen) Days.  Dispense: 6 capsule; Refill: 11           Glycemic Management      Diabetes mellitus type 1 not controlled due to hyperglycemia and hypglycemia      Lab Results   Component Value Date    HGBA1C 7.8 (H) 09/13/2017             OmniPod insulin pump     basal          MN - 0.600  8 am - 0.750        Carb ratio        Doing set dosese     Gives 3 units for light meal     Gives 5 units for  Heavy meal         correction      Uses her own sliding scale      1 unit for every 100 above 200            bolusing by experience     Approve Dexcom G6        The patient has diabetes mellitus, insulin-dependent.     Our Diabetes Department has evaluated the patient in the last six months and will continue counseling on insulin adjustment.      The patient performs blood glucose testing at least four times daily with proven glucose variability from 50 to 300 mg per dl.     The patient is administering basal insulin and prandial insulin four times per day for more than six months.     The patient uses a home blood glucose monitor to assess blood glucose at least four times daily for more than six months.     The patient requires frequent adjustment of insulin treatment regimen based on blood glucose readings.     The patient has frequent variability in blood glucose readings due to activity and variability in meal content and time.      The patient has completed a diabetes education program with us.     The patient has demonstrated the ability to self-monitor her glucose.      The patient is motivated in improving diabetes control      The patient has hypoglycemia unawareness         before pump      lantus 25 units at night        Humalog 5 to 15 based on experience  insurance doesn't want to  pay for freestyle        ---------------------------------------------------------------------     she is checking with a different meter and having to enter that into omnipod and that , of course, results in underdocumentation when she forgets to enter. This is particularly true with low glucose     needs freestyle     Patient has Ketone Strips     Patient was advised to go to ER if BG is more than 250, ketones are present and symptoms are present      Hypoglycemia Management     Discussed Rule of 15   Patent Has Glucagon Pen      Lipid Management        Lab Results   Component Value Date     (H) 09/13/2017           Lipitor 20 mg         Blood Pressure Management     Patient is on ACE inhibitor    On altace and bp controlled           Microvascular Complications     Last Eye Exam      Microalbuminuria present        Patient is on ACE inhibitor     Diabetic Neuropathy none      Weight  Management      Patient's Body mass index is 25.62 kg/m². indicating that she is overweight (BMI 25-29.9). Obesity-related health conditions include the following: diabetes mellitus. Obesity is unchanged. BMI is is above average; no BMI management plan is appropriate. We discussed portion control and increasing exercise..                   Bone / Calcium     Vit d def , 2-16 at 24     On vit d 50 th u q 2 w      Thyroid                Lab Results   Component Value Date     TSH 4.220 09/13/2017      Synthroid 100 mcg daily         Dec. 2019      TSH normal per patient         B12     Lab Results   Component Value Date    NSCOLGYO42 291 09/13/2017           preventive care    Smoker    Suzy Coyne  reports that she has been smoking cigarettes. She has been smoking about 0.50 packs per day. She has never used smokeless tobacco.. I have educated her on the risk of diseases from using tobacco products such as cancer, COPD and heart disease.     I advised her to quit and she is not willing to quit.    I spent 3  minutes  counseling the patient.                 Follow Up   Return in about 6 months (around 2/28/2022) for Recheck.  Patient was given instructions and counseling regarding her condition or for health maintenance advice. Please see specific information pulled into the AVS if appropriate.         This document has been electronically signed by OMI Duong on August 31, 2021 16:30 CDT.

## 2021-09-07 ENCOUNTER — DOCUMENTATION (OUTPATIENT)
Dept: ENDOCRINOLOGY | Facility: CLINIC | Age: 51
End: 2021-09-07

## 2021-09-07 RX ORDER — GLUCAGON 3 MG/1
1 POWDER NASAL AS NEEDED
Qty: 2 EACH | Refills: 11 | Status: SHIPPED | OUTPATIENT
Start: 2021-09-07

## 2021-09-07 NOTE — PROGRESS NOTES
Message was sent to Rehan Portillo that the pt's insurance will not cover Gvoke but will cover Baqsimi

## 2021-09-08 ENCOUNTER — DOCUMENTATION (OUTPATIENT)
Dept: CARDIOLOGY | Facility: CLINIC | Age: 51
End: 2021-09-08

## 2021-09-15 ENCOUNTER — DOCUMENTATION (OUTPATIENT)
Dept: ENDOCRINOLOGY | Facility: CLINIC | Age: 51
End: 2021-09-15

## 2021-09-15 RX ORDER — GLUCAGON INJECTION, SOLUTION 1 MG/.2ML
1 INJECTION, SOLUTION SUBCUTANEOUS AS NEEDED
Qty: 0.4 ML | Refills: 11 | Status: SHIPPED | OUTPATIENT
Start: 2021-09-15 | End: 2021-09-16

## 2021-09-20 ENCOUNTER — TRANSCRIBE ORDERS (OUTPATIENT)
Dept: ORTHOPEDIC SURGERY | Facility: CLINIC | Age: 51
End: 2021-09-20

## 2021-09-20 DIAGNOSIS — M67.431 GANGLION CYST OF DORSUM OF RIGHT WRIST: Primary | ICD-10-CM

## 2021-09-21 ENCOUNTER — TELEPHONE (OUTPATIENT)
Dept: ENDOCRINOLOGY | Facility: CLINIC | Age: 51
End: 2021-09-21

## 2021-09-21 NOTE — TELEPHONE ENCOUNTER
PT CALLED TO SEE ABOUT RX FOR GVOKE INSULIN PEN.    PHARMACY HAS NOT REC'D RX FOR THIS OR PA.    PLEASE CALL PT     [phone]

## 2021-10-07 ENCOUNTER — TELEPHONE (OUTPATIENT)
Dept: ENDOCRINOLOGY | Facility: CLINIC | Age: 51
End: 2021-10-07

## 2021-10-07 NOTE — TELEPHONE ENCOUNTER
[facility]  Needs a copy of medical nec and chart notes for pt DM care   Has faxed a couple of requests       Call [phone]  Fax [phone]

## 2021-10-18 ENCOUNTER — DOCUMENTATION (OUTPATIENT)
Dept: ENDOCRINOLOGY | Facility: CLINIC | Age: 51
End: 2021-10-18

## 2021-10-29 ENCOUNTER — TELEPHONE (OUTPATIENT)
Dept: ENDOCRINOLOGY | Facility: CLINIC | Age: 51
End: 2021-10-29

## 2022-02-01 ENCOUNTER — DOCUMENTATION (OUTPATIENT)
Dept: ENDOCRINOLOGY | Facility: CLINIC | Age: 52
End: 2022-02-01

## 2022-05-18 ENCOUNTER — DOCUMENTATION (OUTPATIENT)
Dept: ENDOCRINOLOGY | Facility: CLINIC | Age: 52
End: 2022-05-18